# Patient Record
Sex: MALE | Race: WHITE | NOT HISPANIC OR LATINO | Employment: FULL TIME | URBAN - METROPOLITAN AREA
[De-identification: names, ages, dates, MRNs, and addresses within clinical notes are randomized per-mention and may not be internally consistent; named-entity substitution may affect disease eponyms.]

---

## 2017-03-29 ENCOUNTER — HOSPITAL ENCOUNTER (OUTPATIENT)
Dept: LAB | Facility: MEDICAL CENTER | Age: 57
End: 2017-03-29
Payer: COMMERCIAL

## 2017-03-29 LAB
T3FREE SERPL-MCNC: 3.29 PG/ML (ref 2.4–4.2)
T4 FREE SERPL-MCNC: 0.84 NG/DL (ref 0.53–1.43)
TSH SERPL DL<=0.005 MIU/L-ACNC: 2.27 UIU/ML (ref 0.3–3.7)

## 2017-03-29 PROCEDURE — 84482 T3 REVERSE: CPT

## 2017-03-29 PROCEDURE — 84481 FREE ASSAY (FT-3): CPT

## 2017-03-29 PROCEDURE — 36415 COLL VENOUS BLD VENIPUNCTURE: CPT

## 2017-03-29 PROCEDURE — 84443 ASSAY THYROID STIM HORMONE: CPT

## 2017-03-29 PROCEDURE — 84439 ASSAY OF FREE THYROXINE: CPT

## 2017-04-02 LAB — T3REVERSE SERPL-MCNC: 8.5 NG/DL (ref 9–27)

## 2017-04-29 ENCOUNTER — HOSPITAL ENCOUNTER (OUTPATIENT)
Dept: LAB | Facility: MEDICAL CENTER | Age: 57
End: 2017-04-29
Attending: FAMILY MEDICINE
Payer: COMMERCIAL

## 2017-04-29 LAB
ALBUMIN SERPL BCP-MCNC: 4.5 G/DL (ref 3.2–4.9)
ALBUMIN/GLOB SERPL: 1.7 G/DL
ALP SERPL-CCNC: 42 U/L (ref 30–99)
ALT SERPL-CCNC: 26 U/L (ref 2–50)
ANION GAP SERPL CALC-SCNC: 7 MMOL/L (ref 0–11.9)
AST SERPL-CCNC: 21 U/L (ref 12–45)
BASOPHILS # BLD AUTO: 0.8 % (ref 0–1.8)
BASOPHILS # BLD: 0.04 K/UL (ref 0–0.12)
BILIRUB SERPL-MCNC: 0.7 MG/DL (ref 0.1–1.5)
BUN SERPL-MCNC: 11 MG/DL (ref 8–22)
CALCIUM SERPL-MCNC: 9.3 MG/DL (ref 8.5–10.5)
CHLORIDE SERPL-SCNC: 106 MMOL/L (ref 96–112)
CO2 SERPL-SCNC: 24 MMOL/L (ref 20–33)
CREAT SERPL-MCNC: 0.74 MG/DL (ref 0.5–1.4)
DHEA-S SERPL-MCNC: 142.9 UG/DL (ref 51.7–295)
EOSINOPHIL # BLD AUTO: 0.17 K/UL (ref 0–0.51)
EOSINOPHIL NFR BLD: 3.3 % (ref 0–6.9)
ERYTHROCYTE [DISTWIDTH] IN BLOOD BY AUTOMATED COUNT: 44.2 FL (ref 35.9–50)
ESTRADIOL SERPL-MCNC: <20 PG/ML
GFR SERPL CREATININE-BSD FRML MDRD: >60 ML/MIN/1.73 M 2
GLOBULIN SER CALC-MCNC: 2.6 G/DL (ref 1.9–3.5)
GLUCOSE SERPL-MCNC: 102 MG/DL (ref 65–99)
HCT VFR BLD AUTO: 51 % (ref 42–52)
HGB BLD-MCNC: 16.8 G/DL (ref 14–18)
IMM GRANULOCYTES # BLD AUTO: 0.02 K/UL (ref 0–0.11)
IMM GRANULOCYTES NFR BLD AUTO: 0.4 % (ref 0–0.9)
LYMPHOCYTES # BLD AUTO: 1.93 K/UL (ref 1–4.8)
LYMPHOCYTES NFR BLD: 38 % (ref 22–41)
MCH RBC QN AUTO: 29.5 PG (ref 27–33)
MCHC RBC AUTO-ENTMCNC: 32.9 G/DL (ref 33.7–35.3)
MCV RBC AUTO: 89.5 FL (ref 81.4–97.8)
MONOCYTES # BLD AUTO: 0.38 K/UL (ref 0–0.85)
MONOCYTES NFR BLD AUTO: 7.5 % (ref 0–13.4)
NEUTROPHILS # BLD AUTO: 2.54 K/UL (ref 1.82–7.42)
NEUTROPHILS NFR BLD: 50 % (ref 44–72)
NRBC # BLD AUTO: 0 K/UL
NRBC BLD AUTO-RTO: 0 /100 WBC
PLATELET # BLD AUTO: 257 K/UL (ref 164–446)
PMV BLD AUTO: 10.7 FL (ref 9–12.9)
POTASSIUM SERPL-SCNC: 4 MMOL/L (ref 3.6–5.5)
PROT SERPL-MCNC: 7.1 G/DL (ref 6–8.2)
RBC # BLD AUTO: 5.7 M/UL (ref 4.7–6.1)
SODIUM SERPL-SCNC: 137 MMOL/L (ref 135–145)
T3FREE SERPL-MCNC: 3.12 PG/ML (ref 2.4–4.2)
T4 SERPL-MCNC: 5.8 UG/DL (ref 4–12)
THYROPEROXIDASE AB SERPL-ACNC: 5.1 IU/ML (ref 0–9)
TSH SERPL DL<=0.005 MIU/L-ACNC: 2.08 UIU/ML (ref 0.3–3.7)
WBC # BLD AUTO: 5.1 K/UL (ref 4.8–10.8)

## 2017-04-29 PROCEDURE — 84481 FREE ASSAY (FT-3): CPT

## 2017-04-29 PROCEDURE — 84482 T3 REVERSE: CPT

## 2017-04-29 PROCEDURE — 82670 ASSAY OF TOTAL ESTRADIOL: CPT

## 2017-04-29 PROCEDURE — 82627 DEHYDROEPIANDROSTERONE: CPT

## 2017-04-29 PROCEDURE — 84305 ASSAY OF SOMATOMEDIN: CPT

## 2017-04-29 PROCEDURE — 82542 COL CHROMOTOGRAPHY QUAL/QUAN: CPT

## 2017-04-29 PROCEDURE — 84270 ASSAY OF SEX HORMONE GLOBUL: CPT

## 2017-04-29 PROCEDURE — 85025 COMPLETE CBC W/AUTO DIFF WBC: CPT

## 2017-04-29 PROCEDURE — 86376 MICROSOMAL ANTIBODY EACH: CPT

## 2017-04-29 PROCEDURE — 84443 ASSAY THYROID STIM HORMONE: CPT

## 2017-04-29 PROCEDURE — 84402 ASSAY OF FREE TESTOSTERONE: CPT

## 2017-04-29 PROCEDURE — 36415 COLL VENOUS BLD VENIPUNCTURE: CPT

## 2017-04-29 PROCEDURE — 84403 ASSAY OF TOTAL TESTOSTERONE: CPT

## 2017-04-29 PROCEDURE — 84436 ASSAY OF TOTAL THYROXINE: CPT

## 2017-04-29 PROCEDURE — 80053 COMPREHEN METABOLIC PANEL: CPT

## 2017-05-01 LAB
IGF-I SERPL-MCNC: 144 NG/ML (ref 68–245)
SHBG SERPL-SCNC: 45 NMOL/L (ref 11–80)
TESTOST FREE MFR SERPL: 1.5 % (ref 1.6–2.9)
TESTOST FREE SERPL-MCNC: 56 PG/ML (ref 47–244)
TESTOST SERPL-MCNC: 365 NG/DL (ref 300–890)

## 2017-05-03 LAB — ANDROSTANOLONE SERPL-MCNC: 298 PG/ML (ref 106–719)

## 2017-05-04 LAB — T3REVERSE SERPL-MCNC: 10 NG/DL (ref 9–27)

## 2017-05-25 ENCOUNTER — TELEPHONE (OUTPATIENT)
Dept: PULMONOLOGY | Facility: HOSPICE | Age: 57
End: 2017-05-25

## 2017-05-25 DIAGNOSIS — G47.33 OBSTRUCTIVE SLEEP APNEA: ICD-10-CM

## 2017-08-12 ENCOUNTER — HOSPITAL ENCOUNTER (OUTPATIENT)
Dept: LAB | Facility: MEDICAL CENTER | Age: 57
End: 2017-08-12
Attending: FAMILY MEDICINE
Payer: COMMERCIAL

## 2017-08-12 LAB
25(OH)D3 SERPL-MCNC: 40 NG/ML (ref 30–100)
ALBUMIN SERPL BCP-MCNC: 4.3 G/DL (ref 3.2–4.9)
ALBUMIN/GLOB SERPL: 1.6 G/DL
ALP SERPL-CCNC: 42 U/L (ref 30–99)
ALT SERPL-CCNC: 21 U/L (ref 2–50)
ANION GAP SERPL CALC-SCNC: 8 MMOL/L (ref 0–11.9)
AST SERPL-CCNC: 23 U/L (ref 12–45)
BILIRUB SERPL-MCNC: 0.9 MG/DL (ref 0.1–1.5)
BUN SERPL-MCNC: 14 MG/DL (ref 8–22)
CALCIUM SERPL-MCNC: 9.6 MG/DL (ref 8.5–10.5)
CHLORIDE SERPL-SCNC: 104 MMOL/L (ref 96–112)
CO2 SERPL-SCNC: 26 MMOL/L (ref 20–33)
CREAT SERPL-MCNC: 0.84 MG/DL (ref 0.5–1.4)
CRP SERPL HS-MCNC: 1.3 MG/L (ref 0–7.5)
DHEA-S SERPL-MCNC: 300.9 UG/DL (ref 51.7–295)
EST. AVERAGE GLUCOSE BLD GHB EST-MCNC: 100 MG/DL
ESTRADIOL SERPL-MCNC: 44 PG/ML
GFR SERPL CREATININE-BSD FRML MDRD: >60 ML/MIN/1.73 M 2
GLOBULIN SER CALC-MCNC: 2.7 G/DL (ref 1.9–3.5)
GLUCOSE SERPL-MCNC: 98 MG/DL (ref 65–99)
HBA1C MFR BLD: 5.1 % (ref 0–5.6)
HCT VFR BLD AUTO: 53.4 % (ref 42–52)
HCYS SERPL-SCNC: 13.63 UMOL/L
HGB BLD-MCNC: 17.5 G/DL (ref 14–18)
POTASSIUM SERPL-SCNC: 4 MMOL/L (ref 3.6–5.5)
PROT SERPL-MCNC: 7 G/DL (ref 6–8.2)
PSA SERPL-MCNC: 0.24 NG/ML (ref 0–4)
SODIUM SERPL-SCNC: 138 MMOL/L (ref 135–145)
T3FREE SERPL-MCNC: 3.15 PG/ML (ref 2.4–4.2)
TSH SERPL DL<=0.005 MIU/L-ACNC: 1.93 UIU/ML (ref 0.3–3.7)

## 2017-08-12 PROCEDURE — 84402 ASSAY OF FREE TESTOSTERONE: CPT

## 2017-08-12 PROCEDURE — 84270 ASSAY OF SEX HORMONE GLOBUL: CPT

## 2017-08-12 PROCEDURE — 84443 ASSAY THYROID STIM HORMONE: CPT

## 2017-08-12 PROCEDURE — 82542 COL CHROMOTOGRAPHY QUAL/QUAN: CPT

## 2017-08-12 PROCEDURE — 84403 ASSAY OF TOTAL TESTOSTERONE: CPT

## 2017-08-12 PROCEDURE — 85018 HEMOGLOBIN: CPT

## 2017-08-12 PROCEDURE — 80053 COMPREHEN METABOLIC PANEL: CPT

## 2017-08-12 PROCEDURE — 85014 HEMATOCRIT: CPT

## 2017-08-12 PROCEDURE — 83090 ASSAY OF HOMOCYSTEINE: CPT

## 2017-08-12 PROCEDURE — 82306 VITAMIN D 25 HYDROXY: CPT

## 2017-08-12 PROCEDURE — 84481 FREE ASSAY (FT-3): CPT

## 2017-08-12 PROCEDURE — 84153 ASSAY OF PSA TOTAL: CPT

## 2017-08-12 PROCEDURE — 83525 ASSAY OF INSULIN: CPT

## 2017-08-12 PROCEDURE — 84305 ASSAY OF SOMATOMEDIN: CPT

## 2017-08-12 PROCEDURE — 83036 HEMOGLOBIN GLYCOSYLATED A1C: CPT

## 2017-08-12 PROCEDURE — 86141 C-REACTIVE PROTEIN HS: CPT

## 2017-08-12 PROCEDURE — 36415 COLL VENOUS BLD VENIPUNCTURE: CPT

## 2017-08-12 PROCEDURE — 82627 DEHYDROEPIANDROSTERONE: CPT

## 2017-08-12 PROCEDURE — 82670 ASSAY OF TOTAL ESTRADIOL: CPT

## 2017-08-14 LAB
IGF-I SERPL-MCNC: 203 NG/ML (ref 68–245)
INSULIN P FAST SERPL-ACNC: 14 UIU/ML (ref 3–19)
SHBG SERPL-SCNC: 47 NMOL/L (ref 11–80)
TESTOST FREE MFR SERPL: 1.6 % (ref 1.6–2.9)
TESTOST FREE SERPL-MCNC: 121 PG/ML (ref 47–244)
TESTOST SERPL-MCNC: 740 NG/DL (ref 300–890)

## 2017-08-15 LAB — ANDROSTANOLONE SERPL-MCNC: 366 PG/ML (ref 106–719)

## 2018-01-30 ENCOUNTER — HOSPITAL ENCOUNTER (OUTPATIENT)
Dept: LAB | Facility: MEDICAL CENTER | Age: 58
End: 2018-01-30
Attending: FAMILY MEDICINE
Payer: COMMERCIAL

## 2018-01-30 LAB
25(OH)D3 SERPL-MCNC: 27 NG/ML (ref 30–100)
ALBUMIN SERPL BCP-MCNC: 4.4 G/DL (ref 3.2–4.9)
ALBUMIN/GLOB SERPL: 2 G/DL
ALP SERPL-CCNC: 39 U/L (ref 30–99)
ALT SERPL-CCNC: 24 U/L (ref 2–50)
ANION GAP SERPL CALC-SCNC: 10 MMOL/L (ref 0–11.9)
AST SERPL-CCNC: 22 U/L (ref 12–45)
BASOPHILS # BLD AUTO: 0.6 % (ref 0–1.8)
BASOPHILS # BLD: 0.03 K/UL (ref 0–0.12)
BILIRUB SERPL-MCNC: 0.9 MG/DL (ref 0.1–1.5)
BUN SERPL-MCNC: 16 MG/DL (ref 8–22)
CALCIUM SERPL-MCNC: 8.6 MG/DL (ref 8.5–10.5)
CHLORIDE SERPL-SCNC: 104 MMOL/L (ref 96–112)
CO2 SERPL-SCNC: 24 MMOL/L (ref 20–33)
CREAT SERPL-MCNC: 0.85 MG/DL (ref 0.5–1.4)
CRP SERPL HS-MCNC: 1.7 MG/L (ref 0–7.5)
DHEA-S SERPL-MCNC: 285.2 UG/DL (ref 51.7–295)
EOSINOPHIL # BLD AUTO: 0.13 K/UL (ref 0–0.51)
EOSINOPHIL NFR BLD: 2.4 % (ref 0–6.9)
ERYTHROCYTE [DISTWIDTH] IN BLOOD BY AUTOMATED COUNT: 43.5 FL (ref 35.9–50)
ESTRADIOL SERPL-MCNC: <20 PG/ML
GLOBULIN SER CALC-MCNC: 2.2 G/DL (ref 1.9–3.5)
GLUCOSE SERPL-MCNC: 104 MG/DL (ref 65–99)
HCT VFR BLD AUTO: 47.2 % (ref 42–52)
HGB BLD-MCNC: 16.3 G/DL (ref 14–18)
IMM GRANULOCYTES # BLD AUTO: 0.01 K/UL (ref 0–0.11)
IMM GRANULOCYTES NFR BLD AUTO: 0.2 % (ref 0–0.9)
LYMPHOCYTES # BLD AUTO: 2.01 K/UL (ref 1–4.8)
LYMPHOCYTES NFR BLD: 37.5 % (ref 22–41)
MCH RBC QN AUTO: 30.5 PG (ref 27–33)
MCHC RBC AUTO-ENTMCNC: 34.5 G/DL (ref 33.7–35.3)
MCV RBC AUTO: 88.4 FL (ref 81.4–97.8)
MONOCYTES # BLD AUTO: 0.43 K/UL (ref 0–0.85)
MONOCYTES NFR BLD AUTO: 8 % (ref 0–13.4)
NEUTROPHILS # BLD AUTO: 2.75 K/UL (ref 1.82–7.42)
NEUTROPHILS NFR BLD: 51.3 % (ref 44–72)
NRBC # BLD AUTO: 0 K/UL
NRBC BLD-RTO: 0 /100 WBC
PLATELET # BLD AUTO: 238 K/UL (ref 164–446)
PMV BLD AUTO: 10.4 FL (ref 9–12.9)
POTASSIUM SERPL-SCNC: 3.9 MMOL/L (ref 3.6–5.5)
PROT SERPL-MCNC: 6.6 G/DL (ref 6–8.2)
RBC # BLD AUTO: 5.34 M/UL (ref 4.7–6.1)
SODIUM SERPL-SCNC: 138 MMOL/L (ref 135–145)
T3FREE SERPL-MCNC: 3.1 PG/ML (ref 2.4–4.2)
T4 FREE SERPL-MCNC: 0.83 NG/DL (ref 0.53–1.43)
TSH SERPL DL<=0.005 MIU/L-ACNC: 2.01 UIU/ML (ref 0.38–5.33)
WBC # BLD AUTO: 5.4 K/UL (ref 4.8–10.8)

## 2018-01-30 PROCEDURE — 84402 ASSAY OF FREE TESTOSTERONE: CPT

## 2018-01-30 PROCEDURE — 84305 ASSAY OF SOMATOMEDIN: CPT

## 2018-01-30 PROCEDURE — 83525 ASSAY OF INSULIN: CPT

## 2018-01-30 PROCEDURE — 80053 COMPREHEN METABOLIC PANEL: CPT

## 2018-01-30 PROCEDURE — 84482 T3 REVERSE: CPT

## 2018-01-30 PROCEDURE — 86141 C-REACTIVE PROTEIN HS: CPT

## 2018-01-30 PROCEDURE — 85025 COMPLETE CBC W/AUTO DIFF WBC: CPT

## 2018-01-30 PROCEDURE — 36415 COLL VENOUS BLD VENIPUNCTURE: CPT

## 2018-01-30 PROCEDURE — 84403 ASSAY OF TOTAL TESTOSTERONE: CPT

## 2018-01-30 PROCEDURE — 82670 ASSAY OF TOTAL ESTRADIOL: CPT

## 2018-01-30 PROCEDURE — 82627 DEHYDROEPIANDROSTERONE: CPT

## 2018-01-30 PROCEDURE — 84481 FREE ASSAY (FT-3): CPT

## 2018-01-30 PROCEDURE — 82306 VITAMIN D 25 HYDROXY: CPT

## 2018-01-30 PROCEDURE — 84443 ASSAY THYROID STIM HORMONE: CPT

## 2018-01-30 PROCEDURE — 84270 ASSAY OF SEX HORMONE GLOBUL: CPT

## 2018-01-30 PROCEDURE — 84439 ASSAY OF FREE THYROXINE: CPT

## 2018-02-01 LAB
IGF-I SERPL-MCNC: 113 NG/ML (ref 59–206)
IGF-I Z-SCORE SERPL: -0.2
INSULIN P FAST SERPL-ACNC: 16 UIU/ML (ref 3–19)
SHBG SERPL-SCNC: 42 NMOL/L (ref 11–80)
TESTOST FREE MFR SERPL: 1.5 % (ref 1.6–2.9)
TESTOST FREE SERPL-MCNC: 42 PG/ML (ref 47–244)
TESTOST SERPL-MCNC: 271 NG/DL (ref 300–890)

## 2018-02-08 LAB — T3REVERSE SERPL-MCNC: 13.4 NG/DL (ref 9–27)

## 2018-08-18 ENCOUNTER — HOSPITAL ENCOUNTER (OUTPATIENT)
Dept: LAB | Facility: MEDICAL CENTER | Age: 58
End: 2018-08-18
Attending: FAMILY MEDICINE
Payer: COMMERCIAL

## 2018-08-18 LAB
25(OH)D3 SERPL-MCNC: 44 NG/ML (ref 30–100)
ALBUMIN SERPL BCP-MCNC: 4.5 G/DL (ref 3.2–4.9)
ALBUMIN/GLOB SERPL: 1.7 G/DL
ALP SERPL-CCNC: 53 U/L (ref 30–99)
ALT SERPL-CCNC: 23 U/L (ref 2–50)
ANION GAP SERPL CALC-SCNC: 11 MMOL/L (ref 0–11.9)
AST SERPL-CCNC: 17 U/L (ref 12–45)
BASOPHILS # BLD AUTO: 0.5 % (ref 0–1.8)
BASOPHILS # BLD: 0.03 K/UL (ref 0–0.12)
BILIRUB SERPL-MCNC: 1 MG/DL (ref 0.1–1.5)
BUN SERPL-MCNC: 15 MG/DL (ref 8–22)
CALCIUM SERPL-MCNC: 9.5 MG/DL (ref 8.5–10.5)
CHLORIDE SERPL-SCNC: 105 MMOL/L (ref 96–112)
CO2 SERPL-SCNC: 25 MMOL/L (ref 20–33)
CREAT SERPL-MCNC: 0.85 MG/DL (ref 0.5–1.4)
DHEA-S SERPL-MCNC: 231.1 UG/DL (ref 51.7–295)
EOSINOPHIL # BLD AUTO: 0.44 K/UL (ref 0–0.51)
EOSINOPHIL NFR BLD: 7 % (ref 0–6.9)
ERYTHROCYTE [DISTWIDTH] IN BLOOD BY AUTOMATED COUNT: 46.2 FL (ref 35.9–50)
EST. AVERAGE GLUCOSE BLD GHB EST-MCNC: 117 MG/DL
ESTRADIOL SERPL-MCNC: <20 PG/ML
GLOBULIN SER CALC-MCNC: 2.6 G/DL (ref 1.9–3.5)
GLUCOSE SERPL-MCNC: 105 MG/DL (ref 65–99)
HBA1C MFR BLD: 5.7 % (ref 0–5.6)
HCT VFR BLD AUTO: 47.5 % (ref 42–52)
HGB BLD-MCNC: 15.6 G/DL (ref 14–18)
IMM GRANULOCYTES # BLD AUTO: 0.01 K/UL (ref 0–0.11)
IMM GRANULOCYTES NFR BLD AUTO: 0.2 % (ref 0–0.9)
LYMPHOCYTES # BLD AUTO: 2.03 K/UL (ref 1–4.8)
LYMPHOCYTES NFR BLD: 32.5 % (ref 22–41)
MCH RBC QN AUTO: 29.6 PG (ref 27–33)
MCHC RBC AUTO-ENTMCNC: 32.8 G/DL (ref 33.7–35.3)
MCV RBC AUTO: 90.1 FL (ref 81.4–97.8)
MONOCYTES # BLD AUTO: 0.41 K/UL (ref 0–0.85)
MONOCYTES NFR BLD AUTO: 6.6 % (ref 0–13.4)
NEUTROPHILS # BLD AUTO: 3.33 K/UL (ref 1.82–7.42)
NEUTROPHILS NFR BLD: 53.2 % (ref 44–72)
NRBC # BLD AUTO: 0 K/UL
NRBC BLD-RTO: 0 /100 WBC
PLATELET # BLD AUTO: 249 K/UL (ref 164–446)
PMV BLD AUTO: 10.6 FL (ref 9–12.9)
POTASSIUM SERPL-SCNC: 4 MMOL/L (ref 3.6–5.5)
PROT SERPL-MCNC: 7.1 G/DL (ref 6–8.2)
RBC # BLD AUTO: 5.27 M/UL (ref 4.7–6.1)
SODIUM SERPL-SCNC: 141 MMOL/L (ref 135–145)
T3FREE SERPL-MCNC: 2.59 PG/ML (ref 2.4–4.2)
T4 FREE SERPL-MCNC: 0.7 NG/DL (ref 0.53–1.43)
TSH SERPL DL<=0.005 MIU/L-ACNC: 0.98 UIU/ML (ref 0.38–5.33)
WBC # BLD AUTO: 6.3 K/UL (ref 4.8–10.8)

## 2018-08-18 PROCEDURE — 84305 ASSAY OF SOMATOMEDIN: CPT

## 2018-08-18 PROCEDURE — 82627 DEHYDROEPIANDROSTERONE: CPT

## 2018-08-18 PROCEDURE — 84481 FREE ASSAY (FT-3): CPT

## 2018-08-18 PROCEDURE — 36415 COLL VENOUS BLD VENIPUNCTURE: CPT

## 2018-08-18 PROCEDURE — 83036 HEMOGLOBIN GLYCOSYLATED A1C: CPT

## 2018-08-18 PROCEDURE — 84403 ASSAY OF TOTAL TESTOSTERONE: CPT

## 2018-08-18 PROCEDURE — 82306 VITAMIN D 25 HYDROXY: CPT

## 2018-08-18 PROCEDURE — 80053 COMPREHEN METABOLIC PANEL: CPT

## 2018-08-18 PROCEDURE — 83525 ASSAY OF INSULIN: CPT

## 2018-08-18 PROCEDURE — 84439 ASSAY OF FREE THYROXINE: CPT

## 2018-08-18 PROCEDURE — 84270 ASSAY OF SEX HORMONE GLOBUL: CPT

## 2018-08-18 PROCEDURE — 85025 COMPLETE CBC W/AUTO DIFF WBC: CPT

## 2018-08-18 PROCEDURE — 82670 ASSAY OF TOTAL ESTRADIOL: CPT

## 2018-08-18 PROCEDURE — 84443 ASSAY THYROID STIM HORMONE: CPT

## 2018-08-21 LAB
IGF-I SERPL-MCNC: 166 NG/ML (ref 59–206)
IGF-I Z-SCORE SERPL: 1
INSULIN P FAST SERPL-ACNC: 14 UIU/ML (ref 3–19)
SHBG SERPL-SCNC: 59 NMOL/L (ref 11–80)
TESTOST FREE MFR SERPL: 1.2 % (ref 1.6–2.9)
TESTOST FREE SERPL-MCNC: 33 PG/ML (ref 47–244)
TESTOST SERPL-MCNC: 266 NG/DL (ref 300–890)

## 2019-09-27 ENCOUNTER — HOSPITAL ENCOUNTER (OUTPATIENT)
Dept: LAB | Facility: MEDICAL CENTER | Age: 59
End: 2019-09-27
Attending: FAMILY MEDICINE
Payer: COMMERCIAL

## 2019-09-27 LAB
25(OH)D3 SERPL-MCNC: 36 NG/ML (ref 30–100)
ALBUMIN SERPL BCP-MCNC: 4.7 G/DL (ref 3.2–4.9)
ALBUMIN/GLOB SERPL: 2.4 G/DL
ALP SERPL-CCNC: 53 U/L (ref 30–99)
ALT SERPL-CCNC: 18 U/L (ref 2–50)
ANION GAP SERPL CALC-SCNC: 10 MMOL/L (ref 0–11.9)
AST SERPL-CCNC: 20 U/L (ref 12–45)
BASOPHILS # BLD AUTO: 0.8 % (ref 0–1.8)
BASOPHILS # BLD: 0.04 K/UL (ref 0–0.12)
BILIRUB SERPL-MCNC: 0.8 MG/DL (ref 0.1–1.5)
BUN SERPL-MCNC: 14 MG/DL (ref 8–22)
CALCIUM SERPL-MCNC: 9.2 MG/DL (ref 8.5–10.5)
CHLORIDE SERPL-SCNC: 107 MMOL/L (ref 96–112)
CHOLEST SERPL-MCNC: 217 MG/DL (ref 100–199)
CO2 SERPL-SCNC: 25 MMOL/L (ref 20–33)
CREAT SERPL-MCNC: 1.03 MG/DL (ref 0.5–1.4)
CRP SERPL HS-MCNC: 1.1 MG/L (ref 0–7.5)
DHEA-S SERPL-MCNC: 159.9 UG/DL (ref 51.7–295)
EOSINOPHIL # BLD AUTO: 0.12 K/UL (ref 0–0.51)
EOSINOPHIL NFR BLD: 2.4 % (ref 0–6.9)
ERYTHROCYTE [DISTWIDTH] IN BLOOD BY AUTOMATED COUNT: 46.8 FL (ref 35.9–50)
ESTRADIOL SERPL-MCNC: 33 PG/ML
GLOBULIN SER CALC-MCNC: 2 G/DL (ref 1.9–3.5)
GLUCOSE SERPL-MCNC: 92 MG/DL (ref 65–99)
HCT VFR BLD AUTO: 54.4 % (ref 42–52)
HDLC SERPL-MCNC: 57 MG/DL
HGB BLD-MCNC: 16.8 G/DL (ref 14–18)
IMM GRANULOCYTES # BLD AUTO: 0.02 K/UL (ref 0–0.11)
IMM GRANULOCYTES NFR BLD AUTO: 0.4 % (ref 0–0.9)
LDLC SERPL CALC-MCNC: 146 MG/DL
LYMPHOCYTES # BLD AUTO: 1.86 K/UL (ref 1–4.8)
LYMPHOCYTES NFR BLD: 36.9 % (ref 22–41)
MCH RBC QN AUTO: 29.4 PG (ref 27–33)
MCHC RBC AUTO-ENTMCNC: 30.9 G/DL (ref 33.7–35.3)
MCV RBC AUTO: 95.3 FL (ref 81.4–97.8)
MONOCYTES # BLD AUTO: 0.43 K/UL (ref 0–0.85)
MONOCYTES NFR BLD AUTO: 8.5 % (ref 0–13.4)
NEUTROPHILS # BLD AUTO: 2.57 K/UL (ref 1.82–7.42)
NEUTROPHILS NFR BLD: 51 % (ref 44–72)
NRBC # BLD AUTO: 0 K/UL
NRBC BLD-RTO: 0 /100 WBC
PLATELET # BLD AUTO: 234 K/UL (ref 164–446)
PMV BLD AUTO: 10.6 FL (ref 9–12.9)
POTASSIUM SERPL-SCNC: 4.2 MMOL/L (ref 3.6–5.5)
PROT SERPL-MCNC: 6.7 G/DL (ref 6–8.2)
PSA SERPL-MCNC: 0.25 NG/ML (ref 0–4)
RBC # BLD AUTO: 5.71 M/UL (ref 4.7–6.1)
SODIUM SERPL-SCNC: 142 MMOL/L (ref 135–145)
T3FREE SERPL-MCNC: 3.09 PG/ML (ref 2.4–4.2)
TRIGL SERPL-MCNC: 72 MG/DL (ref 0–149)
TSH SERPL DL<=0.005 MIU/L-ACNC: 0.95 UIU/ML (ref 0.38–5.33)
WBC # BLD AUTO: 5 K/UL (ref 4.8–10.8)

## 2019-09-27 PROCEDURE — 84270 ASSAY OF SEX HORMONE GLOBUL: CPT

## 2019-09-27 PROCEDURE — 82306 VITAMIN D 25 HYDROXY: CPT

## 2019-09-27 PROCEDURE — 84481 FREE ASSAY (FT-3): CPT

## 2019-09-27 PROCEDURE — 86141 C-REACTIVE PROTEIN HS: CPT

## 2019-09-27 PROCEDURE — 84403 ASSAY OF TOTAL TESTOSTERONE: CPT

## 2019-09-27 PROCEDURE — 36415 COLL VENOUS BLD VENIPUNCTURE: CPT

## 2019-09-27 PROCEDURE — 82670 ASSAY OF TOTAL ESTRADIOL: CPT

## 2019-09-27 PROCEDURE — 84482 T3 REVERSE: CPT

## 2019-09-27 PROCEDURE — 82627 DEHYDROEPIANDROSTERONE: CPT

## 2019-09-27 PROCEDURE — 80053 COMPREHEN METABOLIC PANEL: CPT

## 2019-09-27 PROCEDURE — 85025 COMPLETE CBC W/AUTO DIFF WBC: CPT

## 2019-09-27 PROCEDURE — 83525 ASSAY OF INSULIN: CPT

## 2019-09-27 PROCEDURE — 80061 LIPID PANEL: CPT

## 2019-09-27 PROCEDURE — 83036 HEMOGLOBIN GLYCOSYLATED A1C: CPT

## 2019-09-27 PROCEDURE — 84153 ASSAY OF PSA TOTAL: CPT

## 2019-09-27 PROCEDURE — 84443 ASSAY THYROID STIM HORMONE: CPT

## 2019-09-28 LAB
EST. AVERAGE GLUCOSE BLD GHB EST-MCNC: 108 MG/DL
HBA1C MFR BLD: 5.4 % (ref 0–5.6)

## 2019-09-30 LAB
INSULIN P FAST SERPL-ACNC: 10 UIU/ML (ref 3–19)
SHBG SERPL-SCNC: 41 NMOL/L (ref 11–80)
TESTOST FREE MFR SERPL: 1.8 % (ref 1.6–2.9)
TESTOST FREE SERPL-MCNC: 128 PG/ML (ref 47–244)
TESTOST SERPL-MCNC: 720 NG/DL (ref 300–890)

## 2019-10-02 LAB — T3REVERSE SERPL-MCNC: 13.7 NG/DL (ref 9–27)

## 2020-02-10 ENCOUNTER — TELEPHONE (OUTPATIENT)
Dept: PULMONOLOGY | Facility: HOSPICE | Age: 60
End: 2020-02-10

## 2020-02-10 NOTE — TELEPHONE ENCOUNTER
Caller: Brenton    Phone Number: [phone] (home)     Message: Pt called and is requesting an order for a replacement on his CPAP Machine.   (Last OV 10/07/16)    Justine-Does pt need an appt first before this?

## 2020-02-10 NOTE — TELEPHONE ENCOUNTER
Yes he needs to be seen first.  The rule is patient's need yearly OVs for any orders, we will make exceptions but only if they are slightly over a year.    This patient not only needs to be seen - but since it has been over 3 yrs, he will need a new referral from his PCP and need to be scheduled in a NP appt slot once the referral is received.    Please advise pt to obtain referral back to our office

## 2020-02-12 NOTE — TELEPHONE ENCOUNTER
02/10/2020-Called and spoke with pt regarding this. Pt was not happy. He said what do I need to get my supplies. Told pt he would need to get a referral from his PCP and when we receive this we can schedule an appt. Pt hung up.

## 2020-02-29 ENCOUNTER — HOSPITAL ENCOUNTER (OUTPATIENT)
Dept: LAB | Facility: MEDICAL CENTER | Age: 60
End: 2020-02-29
Attending: FAMILY MEDICINE
Payer: COMMERCIAL

## 2020-02-29 LAB
DHEA-S SERPL-MCNC: 212.7 UG/DL (ref 51.7–295)
EST. AVERAGE GLUCOSE BLD GHB EST-MCNC: 114 MG/DL
ESTRADIOL SERPL-MCNC: <20 PG/ML
HBA1C MFR BLD: 5.6 % (ref 0–5.6)
PSA SERPL-MCNC: 0.23 NG/ML (ref 0–4)
T3FREE SERPL-MCNC: 2.92 PG/ML (ref 2.4–4.2)
T4 FREE SERPL-MCNC: 0.69 NG/DL (ref 0.53–1.43)
THYROPEROXIDASE AB SERPL-ACNC: 2.3 IU/ML (ref 0–9)
TSH SERPL DL<=0.005 MIU/L-ACNC: 1.33 UIU/ML (ref 0.38–5.33)

## 2020-02-29 PROCEDURE — 84270 ASSAY OF SEX HORMONE GLOBUL: CPT

## 2020-02-29 PROCEDURE — 86376 MICROSOMAL ANTIBODY EACH: CPT

## 2020-02-29 PROCEDURE — 86800 THYROGLOBULIN ANTIBODY: CPT

## 2020-02-29 PROCEDURE — 84305 ASSAY OF SOMATOMEDIN: CPT

## 2020-02-29 PROCEDURE — 82627 DEHYDROEPIANDROSTERONE: CPT

## 2020-02-29 PROCEDURE — 82670 ASSAY OF TOTAL ESTRADIOL: CPT

## 2020-02-29 PROCEDURE — 84482 T3 REVERSE: CPT

## 2020-02-29 PROCEDURE — 84439 ASSAY OF FREE THYROXINE: CPT

## 2020-02-29 PROCEDURE — 84481 FREE ASSAY (FT-3): CPT

## 2020-02-29 PROCEDURE — 84432 ASSAY OF THYROGLOBULIN: CPT

## 2020-02-29 PROCEDURE — 84443 ASSAY THYROID STIM HORMONE: CPT

## 2020-02-29 PROCEDURE — 84402 ASSAY OF FREE TESTOSTERONE: CPT

## 2020-02-29 PROCEDURE — 84153 ASSAY OF PSA TOTAL: CPT

## 2020-02-29 PROCEDURE — 83036 HEMOGLOBIN GLYCOSYLATED A1C: CPT

## 2020-02-29 PROCEDURE — 36415 COLL VENOUS BLD VENIPUNCTURE: CPT

## 2020-02-29 PROCEDURE — 84403 ASSAY OF TOTAL TESTOSTERONE: CPT

## 2020-02-29 PROCEDURE — 84140 ASSAY OF PREGNENOLONE: CPT

## 2020-03-02 LAB
IGF-I SERPL-MCNC: 148 NG/ML (ref 56–203)
IGF-I Z-SCORE SERPL: 0.7
SHBG SERPL-SCNC: 39 NMOL/L (ref 11–80)
TESTOST FREE MFR SERPL: 1.7 % (ref 1.6–2.9)
TESTOST FREE SERPL-MCNC: 61 PG/ML (ref 47–244)
TESTOST SERPL-MCNC: 367 NG/DL (ref 300–890)
THYROGLOB AB SERPL-ACNC: <0.9 IU/ML (ref 0–4)
THYROGLOB SERPL-MCNC: 3.8 NG/ML (ref 1.3–31.8)
THYROGLOB SERPL-MCNC: NORMAL NG/ML (ref 1.3–31.8)

## 2020-03-03 LAB — PREG SERPL-MCNC: 70 NG/DL (ref 23–173)

## 2020-03-04 LAB — T3REVERSE SERPL-MCNC: 13.3 NG/DL (ref 9–27)

## 2020-03-30 ENCOUNTER — SLEEP CENTER VISIT (OUTPATIENT)
Dept: SLEEP MEDICINE | Facility: MEDICAL CENTER | Age: 60
End: 2020-03-30
Payer: COMMERCIAL

## 2020-03-30 VITALS
OXYGEN SATURATION: 94 % | HEIGHT: 70 IN | WEIGHT: 240 LBS | HEART RATE: 86 BPM | DIASTOLIC BLOOD PRESSURE: 88 MMHG | BODY MASS INDEX: 34.36 KG/M2 | RESPIRATION RATE: 14 BRPM | SYSTOLIC BLOOD PRESSURE: 142 MMHG

## 2020-03-30 DIAGNOSIS — G47.33 OSA (OBSTRUCTIVE SLEEP APNEA): ICD-10-CM

## 2020-03-30 PROCEDURE — 99203 OFFICE O/P NEW LOW 30 MIN: CPT | Performed by: FAMILY MEDICINE

## 2020-03-30 RX ORDER — THYROID,PORK 15 MG
15 TABLET ORAL DAILY
COMMUNITY
Start: 2020-01-28

## 2020-03-30 RX ORDER — THYROID 60 MG
60 TABLET ORAL DAILY
COMMUNITY
Start: 2020-01-28

## 2020-03-30 ASSESSMENT — FIBROSIS 4 INDEX: FIB4 SCORE: 1.17

## 2020-03-30 NOTE — PROGRESS NOTES
"     [facility]  Consult Note     Date: 3/30/2020 / Time: 3:15 PM    Patient ID:   Name:             Brenton Joe   YOB: 1960  Age:                 59 y.o.  male   MRN:               [id number]      Thank you for requesting a sleep medicine consultation on Brenton Joe at the sleep center. He presents today with the chief complaints of JONATHAN and to establish as a new pt. He was previously seen by PMA. Pt was diagnosed with JONATHAN on 2/20/15 via split night study. It showed moderate JONATHAN with AHI of 21.7//hr and the best tolerated pressure was CPAP 8 cm with improved AHI of 0/hr and O2 kelin 90%. he has been on CPAP 8 cm since then.The initial presenting symptoms were snoring and excessive daytime sleepiness. He is referred by Dr. Keita for evaluation and treatment of sleep disorder breathing.     HISTORY OF PRESENT ILLNESS:       At night,  Brenton Joe goes to bed around 10 pm on weekdays and around 11 pm on the weekends. He gets out of bed at 6 am on weekdays and at 8 am on the weekends.  He averages about 8 hrs of sleep on a good night. Pt has rarely has a bad nights. He falls asleep within 15 minutes. He awakens 1 times during the night due to bathroom use. It takes him few min to fall back asleep.    As per suppose questioner,He is not aware of snoring/breathing pauses/gasping or choking in sleep since he has been on the CPAP.  He  denies any symptoms of restless legs syndrome such as an \"urge to move\"  He  legs in the evening or bedtime. He  denies any symptoms of narcolepsy such as sleep paralysis or cataplexy, or any symptoms to suggest parasomnias such as sleep walking or acting out of dreams. He  has not used any medications for the sleep problem.    Overall, he does finds his sleep refreshing.In terms of  excessive daytime sleepiness, he denies of sleepiness while  at work, while reading or watching TV or while driving. He does not take regular naps.     REVIEW OF SYSTEMS: "       Constitutional: Denies fevers, Denies weight changes  Eyes: Denies changes in vision, no eye pain  Ears/Nose/Throat/Mouth: Denies nasal congestion or sore throat   Cardiovascular: Denies chest pain or palpitations   Respiratory: Denies shortness of breath , Denies cough  Gastrointestinal/Hepatic: Denies abdominal pain, nausea, vomiting, diarrhea  Genitourinary: Denies bladder dysfunction, dysuria or frequency  Musculoskeletal/Rheum: Denies  joint pain and swelling   Skin/Breast: Denies rash  Neurological: Denies headache, confusion, memory loss or focal weakness/parasthesias  Psychiatric: denies mood disorder     Comprehensive review of systems form is reviewed with the patient and is attached in the EMR.     PMH:  has a past medical history of Chickenpox, Hyperlipemia (3/31/2014), Hypothyroidism, Hypothyroidism (2014), Scarlet fever, Sleep apnea, Tear meniscus knee (2009), Thyroid nodule (2014), and Tinnitus of both ears (3/28/2014). He also has no past medical history of CAD (coronary artery disease), COPD, Liver disease, Psychiatric disorder, or Seizure disorder (HCC).  MEDS:   Current Outpatient Medications:   •  ARMOUR THYROID 15 MG Tab, , Disp: , Rfl:   •  ARMOUR THYROID 60 MG Tab, , Disp: , Rfl:   •  OMEGA 3 PO, Take  by mouth every day. Takes two, Disp: , Rfl:   •  MULTIVITAMINS PO, Take  by mouth every day., Disp: , Rfl:   •  levothyroxine (SYNTHROID) 100 MCG Tab, TAKE 1 TABLET BY MOUTH EVERY DAY (Patient not taking: Reported on 3/30/2020), Disp: 30 Tab, Rfl: 3  •  SYNTHROID 100 MCG Tab, TAKE 1 TABLET BY MOUTH EVERY DAY (Patient not taking: Reported on 3/30/2020), Disp: 30 Tab, Rfl: 0  •  fluticasone (FLONASE) 50 MCG/ACT nasal spray, USE 1 SPRAY IN EACH NOSTRIL EVERY DAY (Patient not taking: Reported on 3/30/2020), Disp: 1 Bottle, Rfl: 3  ALLERGIES:   Allergies   Allergen Reactions   • Nkda [No Known Drug Allergy]      SURGHX:   Past Surgical History:   Procedure Laterality Date   • COLONOSCOPY   "3/30/12    normal- done by [facility]   • KNEE ARTHROSCOPY  10/5/2009    Performed by CHEPE FRANZ at SURGERY [facility] ORS   • MEDIAL MENISCECTOMY  10/5/2009    Performed by CHEPE FRANZ at SURGERY [facility] ORS   • FINGER EXPLORATION  7/2007    repair artery left index finger   • ARTHROSCOPY, KNEE     • INTUBATION     • TONSILLECTOMY AND ADENOIDECTOMY       SOCHX:  reports that he has never smoked. He has never used smokeless tobacco. He reports current alcohol use. He reports that he does not use drugs.   FH:   Family History   Problem Relation Age of Onset   • Hyperlipidemia Mother    • Hypertension Mother    • Diabetes Mother         borderline DM   • Hyperlipidemia Father    • Hypertension Father    • Cancer Paternal Grandmother         stomach cancer   • Cancer Other    • Sleep Apnea Son            Physical Exam:  Vitals/ General Appearance:   Weight/BMI: Body mass index is 34.44 kg/m².  /88 (BP Location: Left arm, Patient Position: Sitting, BP Cuff Size: Adult)   Pulse 86   Resp 14   Ht 1.778 m (5' 10\")   Wt 108.9 kg (240 lb)   SpO2 94%   Vitals:    03/30/20 1459   BP: 142/88   BP Location: Left arm   Patient Position: Sitting   BP Cuff Size: Adult   Pulse: 86   Resp: 14   SpO2: 94%   Weight: 108.9 kg (240 lb)   Height: 1.778 m (5' 10\")       Pt. is alert and oriented to time, place and person. Cooperative and in no apparent distress.       -Head: Atraumatic, normocephalic.   -. Ears: Normal tympanic membrane and no discharge  -. Nose: No inferior turbinate hypertophy, no septal deviation, no polyp.   -. Throat: Oropharynx appears crowded in that the palate is  overhanging (Malam Sary scale 4. Tonsils are not enlarged, uvula is large, pharynx not inflamed.   -. Neck: Supple. No thyromegaly  -. Chest: Trachea central,   -. Lungs auscultation: B/L good air entry, vesicular breath sounds, no adventitious sounds  -. Heart auscultation: 1st and 2nd heart sounds normal, regular rhythm. No " appreciable murmur.  - Extremities: no clubbing, no pedal edema.  - Skin: No rash  - NEUROLOGICAL EXAMINATION: On neurological exam, the patient was alert and oriented x3. speech was clear and fluent without dysarthria.      INVESTIGATIONS:     1.Obstructive Sleep Apnea .He  Is currently on CPAP 8 with simplus mask. 30 day compliance was downloaded which shows adequate compliance.     The pathophysiology of sleep anea and the increased risk of cardiovascular morbidity from untreated sleep apnea is discussed in detail with the patient.      He is urged to avoid supine sleep, weight gain and alcoholic beverages since all of these can worsen sleep apnea. He is cautioned against drowsy driving. If He feels sleepy while driving, He must pull over for a break/nap, rather than persist on the road, in the interest of He own safety and that of others on the road.   Plan   - Continue CPAP 8 cm with simplus mask    - New Auto CPAP 8-10 cm with f30 mask   - F/u in 60-90 days to assess the efficiacy of recommended pressure    - compliance download was reviewed and discussed with the pt   - compliance was reinforced

## 2020-08-13 ENCOUNTER — APPOINTMENT (OUTPATIENT)
Dept: SLEEP MEDICINE | Facility: MEDICAL CENTER | Age: 60
End: 2020-08-13
Payer: COMMERCIAL

## 2020-09-01 ENCOUNTER — TELEMEDICINE (OUTPATIENT)
Dept: SLEEP MEDICINE | Facility: MEDICAL CENTER | Age: 60
End: 2020-09-01
Payer: COMMERCIAL

## 2020-09-01 VITALS — BODY MASS INDEX: 32.35 KG/M2 | HEIGHT: 70 IN | WEIGHT: 226 LBS

## 2020-09-01 DIAGNOSIS — G47.33 OSA (OBSTRUCTIVE SLEEP APNEA): ICD-10-CM

## 2020-09-01 PROCEDURE — 99213 OFFICE O/P EST LOW 20 MIN: CPT | Mod: 95,CR | Performed by: FAMILY MEDICINE

## 2020-09-01 ASSESSMENT — FIBROSIS 4 INDEX: FIB4 SCORE: 1.17

## 2020-09-01 NOTE — PROGRESS NOTES
Telemedicine Visit: Established Patient     This encounter was conducted via Zoom . Verbal consent was obtained. Patient's identity was verified.   Given the importance of social distancing and other strategies recommended to reduce the risk of COVID-19 transmission, I am providing medical care to this patient via audio/video visit in place of an in person visit at the request of the patient.    [facility] Follow Up Note     Date: 9/1/2020 / Time: 10:22 AM    Patient ID:   Name:             Brenton Joe   YOB: 1960  Age:                 59 y.o.  male   MRN:               [id number]      Thank you for requesting a sleep medicine consultation on Brenton Joe at the sleep center. He presents today with the chief complaints of JONATHAN and 1st compliance on the new CPAP follow up.     HISTORY OF PRESENT ILLNESS:       Pt is currently on Auto CPAP 8-10 cm with F 30 mask. He goes to sleep around 10:30 pm and wakes up around 6:30 am. He is getting about 7.5 hrs of sleep on a good night.. The bad nights are rare per week. Overall, he does finds his sleep refreshing. He does not take regular naps. Denies any other sleep disorder or usage of sleep aid.He is using CPAP most days of the week. Pt reports 8+ hrs of average nightly use of CPAP. Pt denies snoring, gasping,choking.Pt also denies significant mask leak that is interfering with sleep. The 30 day compliance was downloaded which shows adequate compliance with more that 4 hr usage about 100%. The AHI is has improved to 0.5/hr. The mask leak is normal. The symptoms of JONATHAN are well controlled.     SLEEP HISTORY   He was previously seen by PMA. Pt was diagnosed with JONATHAN on 2/20/15 via split night study. It showed moderate JONATHAN with AHI of 21.7//hr and the best tolerated pressure was CPAP 8 cm with improved AHI of 0/hr and O2 kelin 90%.      REVIEW OF SYSTEMS:       Constitutional: Denies fevers, Denies weight changes  Eyes: Denies changes in  vision, no eye pain  Ears/Nose/Throat/Mouth: Denies nasal congestion or sore throat   Cardiovascular: Denies chest pain or palpitations   Respiratory: Denies shortness of breath , Denies cough  Gastrointestinal/Hepatic: Denies abdominal pain, nausea,   Genitourinary: Deniesdysuria or frequency  Musculoskeletal/Rheum: Denies  joint pain and swelling   Skin/Breast: Denies rash,   Neurological: Denies headache, confusion, memory loss or focal weakness/parasthesias  Psychiatric: denies mood disorder   Sleep: denies snoring and apneas     Comprehensive review of systems form is reviewed with the patient and is attached in the EMR.     PMH:  has a past medical history of Chickenpox, Hyperlipemia (3/31/2014), Hypothyroidism, Hypothyroidism (2014), Scarlet fever, Sleep apnea, Tear meniscus knee (2009), Thyroid nodule (2014), and Tinnitus of both ears (3/28/2014). He also has no past medical history of CAD (coronary artery disease), COPD, Liver disease, Psychiatric disorder, or Seizure disorder (HCC).  MEDS:   Current Outpatient Medications:   •  ARMOUR THYROID 15 MG Tab, , Disp: , Rfl:   •  ARMOUR THYROID 60 MG Tab, , Disp: , Rfl:   •  SYNTHROID 100 MCG Tab, TAKE 1 TABLET BY MOUTH EVERY DAY, Disp: 30 Tab, Rfl: 0  •  OMEGA 3 PO, Take  by mouth every day. Takes two, Disp: , Rfl:   •  MULTIVITAMINS PO, Take  by mouth every day., Disp: , Rfl:   •  levothyroxine (SYNTHROID) 100 MCG Tab, TAKE 1 TABLET BY MOUTH EVERY DAY (Patient not taking: Reported on 3/30/2020), Disp: 30 Tab, Rfl: 3  •  fluticasone (FLONASE) 50 MCG/ACT nasal spray, USE 1 SPRAY IN EACH NOSTRIL EVERY DAY (Patient not taking: Reported on 3/30/2020), Disp: 1 Bottle, Rfl: 3  ALLERGIES:   Allergies   Allergen Reactions   • Nkda [No Known Drug Allergy]      SURGHX:   Past Surgical History:   Procedure Laterality Date   • COLONOSCOPY  3/30/12    normal- done by [facility]   • KNEE ARTHROSCOPY  10/5/2009    Performed by CHEPE FRANZ at [facility]   • MEDIAL  "MENISCECTOMY  10/5/2009    Performed by CHEPE FRANZ at SURGERY [facility]S   • FINGER EXPLORATION  7/2007    repair artery left index finger   • ARTHROSCOPY, KNEE     • INTUBATION     • TONSILLECTOMY AND ADENOIDECTOMY       SOCHX:  reports that he has never smoked. He has never used smokeless tobacco. He reports current alcohol use. He reports that he does not use drugs..  FH:   Family History   Problem Relation Age of Onset   • Hyperlipidemia Mother    • Hypertension Mother    • Diabetes Mother         borderline DM   • Hyperlipidemia Father    • Hypertension Father    • Cancer Paternal Grandmother         stomach cancer   • Cancer Other    • Sleep Apnea Son          Physical Exam:  Vitals/ General Appearance:   Weight/BMI: Body mass index is 32.43 kg/m².  Ht 1.778 m (5' 10\")   Wt 102.5 kg (226 lb)   Vitals:    09/01/20 1006   Weight: 102.5 kg (226 lb)   Height: 1.778 m (5' 10\")       Pt. is alert and oriented to time, place and person. Cooperative and in no apparent distress.       Constitutional: Alert, no distress, well-groomed.  Skin: No rashes in visible areas.  Eye: Round. Conjunctiva clear, lids normal. No icterus.   ENMT: Lips pink without lesions, good dentition, moist mucous membranes. Phonation normal.  Neck: No masses, no thyromegaly. Moves freely without pain.  CV: Pulse as reported by patient  Respiratory: Unlabored respiratory effort, no cough or audible wheeze  Psych: Alert and oriented x3, normal affect and mood.     ASSESSMENT AND PLAN     1. Sleep Apnea: The pathophysiology of sleep anea and the increased risk of cardiovascular morbidity from untreated sleep apnea is discussed in detail with the patient. He is urged to avoid supine sleep, weight gain and alcoholic beverages since all of these can worsen sleep apnea. He is cautioned against drowsy driving. If He feels sleepy while driving, He must pull over for a break/nap, rather than persist on the road, in the interest of He own " safety and that of others on the road.   Plan   - Continue ACPAP 8-10 cm    - supplies are refilled    - compliance download was reviewed and discussed with the pt   - compliance was reinforced     2. Regarding treatment of other past medical problems and general health maintenance,  He is urged to follow up with PCP.

## 2020-11-10 ENCOUNTER — HOSPITAL ENCOUNTER (OUTPATIENT)
Dept: LAB | Facility: MEDICAL CENTER | Age: 60
End: 2020-11-10
Attending: FAMILY MEDICINE
Payer: COMMERCIAL

## 2020-11-10 LAB
25(OH)D3 SERPL-MCNC: 58 NG/ML (ref 30–100)
ALBUMIN SERPL BCP-MCNC: 4.4 G/DL (ref 3.2–4.9)
ALBUMIN/GLOB SERPL: 1.8 G/DL
ALP SERPL-CCNC: 53 U/L (ref 30–99)
ALT SERPL-CCNC: 36 U/L (ref 2–50)
ANION GAP SERPL CALC-SCNC: 12 MMOL/L (ref 7–16)
AST SERPL-CCNC: 28 U/L (ref 12–45)
BASOPHILS # BLD AUTO: 0.8 % (ref 0–1.8)
BASOPHILS # BLD: 0.05 K/UL (ref 0–0.12)
BILIRUB SERPL-MCNC: 0.7 MG/DL (ref 0.1–1.5)
BUN SERPL-MCNC: 16 MG/DL (ref 8–22)
CALCIUM SERPL-MCNC: 9.4 MG/DL (ref 8.5–10.5)
CHLORIDE SERPL-SCNC: 103 MMOL/L (ref 96–112)
CO2 SERPL-SCNC: 25 MMOL/L (ref 20–33)
CREAT SERPL-MCNC: 1.04 MG/DL (ref 0.5–1.4)
CRP SERPL HS-MCNC: 2.1 MG/L (ref 0–7.5)
DHEA-S SERPL-MCNC: 231 UG/DL (ref 51.7–295)
EOSINOPHIL # BLD AUTO: 0.24 K/UL (ref 0–0.51)
EOSINOPHIL NFR BLD: 3.9 % (ref 0–6.9)
ERYTHROCYTE [DISTWIDTH] IN BLOOD BY AUTOMATED COUNT: 45.1 FL (ref 35.9–50)
ESTRADIOL SERPL-MCNC: 16.9 PG/ML
GLOBULIN SER CALC-MCNC: 2.5 G/DL (ref 1.9–3.5)
GLUCOSE SERPL-MCNC: 102 MG/DL (ref 65–99)
HCT VFR BLD AUTO: 54 % (ref 42–52)
HGB BLD-MCNC: 17.7 G/DL (ref 14–18)
IMM GRANULOCYTES # BLD AUTO: 0.02 K/UL (ref 0–0.11)
IMM GRANULOCYTES NFR BLD AUTO: 0.3 % (ref 0–0.9)
LYMPHOCYTES # BLD AUTO: 1.98 K/UL (ref 1–4.8)
LYMPHOCYTES NFR BLD: 32.1 % (ref 22–41)
MCH RBC QN AUTO: 29.3 PG (ref 27–33)
MCHC RBC AUTO-ENTMCNC: 32.8 G/DL (ref 33.7–35.3)
MCV RBC AUTO: 89.4 FL (ref 81.4–97.8)
MONOCYTES # BLD AUTO: 0.44 K/UL (ref 0–0.85)
MONOCYTES NFR BLD AUTO: 7.1 % (ref 0–13.4)
NEUTROPHILS # BLD AUTO: 3.43 K/UL (ref 1.82–7.42)
NEUTROPHILS NFR BLD: 55.8 % (ref 44–72)
NRBC # BLD AUTO: 0 K/UL
NRBC BLD-RTO: 0 /100 WBC
PLATELET # BLD AUTO: 254 K/UL (ref 164–446)
PMV BLD AUTO: 10.5 FL (ref 9–12.9)
POTASSIUM SERPL-SCNC: 4.3 MMOL/L (ref 3.6–5.5)
PROT SERPL-MCNC: 6.9 G/DL (ref 6–8.2)
RBC # BLD AUTO: 6.04 M/UL (ref 4.7–6.1)
SODIUM SERPL-SCNC: 140 MMOL/L (ref 135–145)
T3FREE SERPL-MCNC: 2.99 PG/ML (ref 2–4.4)
TSH SERPL DL<=0.005 MIU/L-ACNC: 2.42 UIU/ML (ref 0.38–5.33)
WBC # BLD AUTO: 6.2 K/UL (ref 4.8–10.8)

## 2020-11-10 PROCEDURE — 82627 DEHYDROEPIANDROSTERONE: CPT

## 2020-11-10 PROCEDURE — 83525 ASSAY OF INSULIN: CPT

## 2020-11-10 PROCEDURE — 36415 COLL VENOUS BLD VENIPUNCTURE: CPT

## 2020-11-10 PROCEDURE — 84481 FREE ASSAY (FT-3): CPT

## 2020-11-10 PROCEDURE — 85025 COMPLETE CBC W/AUTO DIFF WBC: CPT

## 2020-11-10 PROCEDURE — 82306 VITAMIN D 25 HYDROXY: CPT

## 2020-11-10 PROCEDURE — 84305 ASSAY OF SOMATOMEDIN: CPT

## 2020-11-10 PROCEDURE — 84443 ASSAY THYROID STIM HORMONE: CPT

## 2020-11-10 PROCEDURE — 82670 ASSAY OF TOTAL ESTRADIOL: CPT

## 2020-11-10 PROCEDURE — 86141 C-REACTIVE PROTEIN HS: CPT

## 2020-11-10 PROCEDURE — 84403 ASSAY OF TOTAL TESTOSTERONE: CPT

## 2020-11-10 PROCEDURE — 80053 COMPREHEN METABOLIC PANEL: CPT

## 2020-11-10 PROCEDURE — 84402 ASSAY OF FREE TESTOSTERONE: CPT

## 2020-11-10 PROCEDURE — 84270 ASSAY OF SEX HORMONE GLOBUL: CPT

## 2020-11-11 LAB
SHBG SERPL-SCNC: 38 NMOL/L (ref 11–80)
TESTOST FREE MFR SERPL: 1.8 % (ref 1.6–2.9)
TESTOST FREE SERPL-MCNC: 89 PG/ML (ref 47–244)
TESTOST SERPL-MCNC: 506 NG/DL (ref 300–890)

## 2020-11-12 LAB — INSULIN P FAST SERPL-ACNC: 12 UIU/ML (ref 3–19)

## 2020-11-15 LAB
IGF-I SERPL-MCNC: 164 NG/ML (ref 55–203)
IGF-I Z-SCORE SERPL: 1

## 2020-12-17 ENCOUNTER — HOSPITAL ENCOUNTER (EMERGENCY)
Facility: MEDICAL CENTER | Age: 60
End: 2020-12-17
Attending: EMERGENCY MEDICINE
Payer: COMMERCIAL

## 2020-12-17 ENCOUNTER — APPOINTMENT (OUTPATIENT)
Dept: RADIOLOGY | Facility: MEDICAL CENTER | Age: 60
End: 2020-12-17
Attending: EMERGENCY MEDICINE
Payer: COMMERCIAL

## 2020-12-17 VITALS
SYSTOLIC BLOOD PRESSURE: 149 MMHG | TEMPERATURE: 98.9 F | WEIGHT: 229.28 LBS | DIASTOLIC BLOOD PRESSURE: 84 MMHG | OXYGEN SATURATION: 96 % | HEART RATE: 86 BPM | HEIGHT: 70 IN | RESPIRATION RATE: 12 BRPM | BODY MASS INDEX: 32.82 KG/M2

## 2020-12-17 DIAGNOSIS — R00.2 PALPITATIONS: ICD-10-CM

## 2020-12-17 DIAGNOSIS — I10 HYPERTENSION, UNSPECIFIED TYPE: ICD-10-CM

## 2020-12-17 LAB
ANION GAP SERPL CALC-SCNC: 11 MMOL/L (ref 7–16)
BASOPHILS # BLD AUTO: 0.9 % (ref 0–1.8)
BASOPHILS # BLD: 0.09 K/UL (ref 0–0.12)
BUN SERPL-MCNC: 13 MG/DL (ref 8–22)
CALCIUM SERPL-MCNC: 9 MG/DL (ref 8.5–10.5)
CHLORIDE SERPL-SCNC: 103 MMOL/L (ref 96–112)
CO2 SERPL-SCNC: 25 MMOL/L (ref 20–33)
CREAT SERPL-MCNC: 0.75 MG/DL (ref 0.5–1.4)
EKG IMPRESSION: NORMAL
EOSINOPHIL # BLD AUTO: 0 K/UL (ref 0–0.51)
EOSINOPHIL NFR BLD: 0 % (ref 0–6.9)
ERYTHROCYTE [DISTWIDTH] IN BLOOD BY AUTOMATED COUNT: 44.8 FL (ref 35.9–50)
GLUCOSE SERPL-MCNC: 105 MG/DL (ref 65–99)
HCT VFR BLD AUTO: 52 % (ref 42–52)
HGB BLD-MCNC: 17.4 G/DL (ref 14–18)
LG PLATELETS BLD QL SMEAR: NORMAL
LYMPHOCYTES # BLD AUTO: 2.96 K/UL (ref 1–4.8)
LYMPHOCYTES NFR BLD: 29.6 % (ref 22–41)
MANUAL DIFF BLD: NORMAL
MCH RBC QN AUTO: 29.1 PG (ref 27–33)
MCHC RBC AUTO-ENTMCNC: 33.5 G/DL (ref 33.7–35.3)
MCV RBC AUTO: 87 FL (ref 81.4–97.8)
MONOCYTES # BLD AUTO: 0.61 K/UL (ref 0–0.85)
MONOCYTES NFR BLD AUTO: 6.1 % (ref 0–13.4)
MORPHOLOGY BLD-IMP: NORMAL
NEUTROPHILS # BLD AUTO: 6.35 K/UL (ref 1.82–7.42)
NEUTROPHILS NFR BLD: 63.5 % (ref 44–72)
NRBC # BLD AUTO: 0 K/UL
NRBC BLD-RTO: 0 /100 WBC
NT-PROBNP SERPL IA-MCNC: 57 PG/ML (ref 0–125)
PLATELET # BLD AUTO: 362 K/UL (ref 164–446)
PLATELET BLD QL SMEAR: NORMAL
PMV BLD AUTO: 10 FL (ref 9–12.9)
POTASSIUM SERPL-SCNC: 3.6 MMOL/L (ref 3.6–5.5)
RBC # BLD AUTO: 5.98 M/UL (ref 4.7–6.1)
RBC BLD AUTO: PRESENT
SODIUM SERPL-SCNC: 139 MMOL/L (ref 135–145)
TROPONIN T SERPL-MCNC: 11 NG/L (ref 6–19)
WBC # BLD AUTO: 10 K/UL (ref 4.8–10.8)

## 2020-12-17 PROCEDURE — 36415 COLL VENOUS BLD VENIPUNCTURE: CPT

## 2020-12-17 PROCEDURE — 71045 X-RAY EXAM CHEST 1 VIEW: CPT

## 2020-12-17 PROCEDURE — 80048 BASIC METABOLIC PNL TOTAL CA: CPT

## 2020-12-17 PROCEDURE — 93005 ELECTROCARDIOGRAM TRACING: CPT | Performed by: EMERGENCY MEDICINE

## 2020-12-17 PROCEDURE — 84484 ASSAY OF TROPONIN QUANT: CPT

## 2020-12-17 PROCEDURE — 83880 ASSAY OF NATRIURETIC PEPTIDE: CPT

## 2020-12-17 PROCEDURE — 85027 COMPLETE CBC AUTOMATED: CPT

## 2020-12-17 PROCEDURE — 99284 EMERGENCY DEPT VISIT MOD MDM: CPT

## 2020-12-17 PROCEDURE — 93005 ELECTROCARDIOGRAM TRACING: CPT

## 2020-12-17 PROCEDURE — 85007 BL SMEAR W/DIFF WBC COUNT: CPT

## 2020-12-17 RX ORDER — HYDROCHLOROTHIAZIDE 12.5 MG/1
12.5 TABLET ORAL DAILY
Qty: 30 TAB | Refills: 0 | Status: SHIPPED | OUTPATIENT
Start: 2020-12-17 | End: 2020-12-17

## 2020-12-17 RX ORDER — HYDROXYZINE PAMOATE 50 MG/1
50 CAPSULE ORAL
COMMUNITY
Start: 2020-12-14 | End: 2020-12-17

## 2020-12-17 ASSESSMENT — FIBROSIS 4 INDEX: FIB4 SCORE: 1.08

## 2020-12-18 NOTE — DISCHARGE INSTRUCTIONS
You were seen in the emergency department for palpitations and high blood pressure.  Given your symptoms, you are being referred to cardiology for follow-up.  Your blood pressure returned to normal while in the emergency department.  It is advisable to wait for further evaluation before starting you on blood pressure medications as these can make your blood pressure dangerously low and you may lose consciousness.    Please return to the emergency department or seek medical attention if you develop:  Difficulty breathing, chest pains, loss of consciousness, any other new or concerning findings

## 2020-12-18 NOTE — ED PROVIDER NOTES
"ED Provider Note    Scribed for Cory Cullen M.D. by Fátima Craig. 12/17/2020,  6:15 PM.    Means of Arrival: Walk In  History obtained from: Patient  History limited by: None    CHIEF COMPLAINT  Chief Complaint   Patient presents with   • Palpitations   • Hypertension       HPI  Brenton Joe is a 60 y.o. male with a history of hypertension and hypothyroidism who presents to the Emergency Department for heart palpitations onset today. The patient states that he was hospitalized at [facility] at the beginning of December after testing positive for Covid-19 and was diagnosed with new onset hypertension. He reports he was discharged 7 days ago and when he returned home, he had \"fluid overload\", bilateral leg edema, and urinary frequency. Patient says he urinated most of the fluids out, and started to feel more improved, but woke up this morning with heart palpitations and hypertension. He states he took his blood pressure several times today. The first time he took his blood pressure he had a pressure of 190/90, and when he took it again a few hours later, he had a pressure of 260/116. The patient was then prompted to come into the [facility] ED this afternoon to be further evaluated. Trazodone was taken for alleviation, and no exacerbating factors were noted. Patient has associated ear ringing, hypertension, and Covid-19 fog, but denies chest pain, fever, chills, or shortness of breath. He also denies taking any recent travels outside of the country. Patient drinks alcohol, but does not use drugs or smoke. He has no known allergies and does not take any daily medications. His PCP is Dr. Keita, and his next appointment with him is on 12/23/2020.    REVIEW OF SYSTEMS  CONSTITUTIONAL:  Positive Covid-19 fog. No fever or chills  HENT: Positive ear ringing   CARDIOVASCULAR: Positive hypertension and heart palpitations. No chest pain   RESPIRATORY:  No shortness of breath    See HPI for further details.   All other systems " "are negative.     PAST MEDICAL HISTORY  Past Medical History:   Diagnosis Date   • Chickenpox    • COVID-19 12/2020   • HTN (hypertension)    • Hyperlipemia 3/31/2014   • Hypothyroidism    • Hypothyroidism 2014   • Scarlet fever    • Sleep apnea    • Tear meniscus knee 2009    right repair   • Thyroid nodule 2014    R \"incidental\" 2.1 mm   • Tinnitus of both ears 3/28/2014       FAMILY HISTORY  Family History   Problem Relation Age of Onset   • Hyperlipidemia Mother    • Hypertension Mother    • Diabetes Mother         borderline DM   • Hyperlipidemia Father    • Hypertension Father    • Cancer Paternal Grandmother         stomach cancer   • Cancer Other    • Sleep Apnea Son        SOCIAL HISTORY   reports that he has never smoked. He has never used smokeless tobacco. He reports current alcohol use. He reports that he does not use drugs.    SURGICAL HISTORY  Past Surgical History:   Procedure Laterality Date   • COLONOSCOPY  3/30/12    normal- done by [facility]   • KNEE ARTHROSCOPY  10/5/2009    Performed by CHEPE FRANZ at SURGERY [facility]   • MEDIAL MENISCECTOMY  10/5/2009    Performed by CHEPE FRANZ at SURGERY [facility] ORS   • FINGER EXPLORATION  7/2007    repair artery left index finger   • ARTHROSCOPY, KNEE     • INTUBATION     • TONSILLECTOMY AND ADENOIDECTOMY         CURRENT MEDICATIONS  Home Medications     Reviewed by Danis Adams (Pharmacy Tech) on 12/17/20 at 1935  Med List Status: Complete   Medication Last Dose Status   ARMOUR THYROID 15 MG Tab 12/17/2020 Active   ARMOUR THYROID 60 MG Tab 12/17/2020 Active   Barberry-Oreg Grape-Goldenseal (BERBERINE COMPLEX PO) 12/17/2020 Active   fluticasone (FLONASE) 50 MCG/ACT nasal spray Not Taking Active   levothyroxine (SYNTHROID) 100 MCG Tab  Active   multivitamin (THERAGRAN) Tab 12/17/2020 Active   NON SPECIFIED 12/17/2020 Active   OMEGA 3 PO 12/17/2020 Active   Pseudoephedrine-APAP-DM (DAYQUIL PO) >3 days ago Active   SYNTHROID 100 MCG Tab " "Not Taking Active   TRAZODONE HCL PO >2 days ago Active                ALLERGIES  Allergies   Allergen Reactions   • Nkda [No Known Drug Allergy]        PHYSICAL EXAM  VITAL SIGNS: BP (!) 166/101   Pulse (!) 103   Temp 37.3 °C (99.1 °F) (Temporal)   Resp 16   Ht 1.778 m (5' 10\")   Wt 104 kg (229 lb 4.5 oz)   SpO2 95%   BMI 32.90 kg/m²    Gen: Alert, no acute distress  HEENT: ATNC  Eyes: PERRL, EOMI, normal conjunctiva.   Neck: trachea midline  Resp: no respiratory distress. Lungs clear bilaterally, No wheezing, rales, or rhonchi  CV: No JVD. Regular rate, Regular rhythm, No murmurs, rubs, or gallops  Abd: non-distended. Soft, Non tender   Ext: Equal radial pulses, Trace pitting edema to the bilateral lower extremities, No calf tenderness, No deformities  Endo: No thryomegaly   Psych: normal mood  Neuro: speech fluent     DIAGNOSTIC STUDIES / PROCEDURES     EKG  Results for orders placed or performed during the hospital encounter of 20   EKG   Result Value Ref Range    Report       [facility] Emergency Dept.    Test Date:  2020  Pt Name:    ANIYAH LAFLEUR                  Department: ER  MRN:        [id number]                      Room:  Gender:     Male                         Technician: [id number]  :        1960                   Requested By:ER TRIAGE PROTOCOL  Order #:    [id number]                    Reading MD: Cory Cullen    Measurements  Intervals                                Axis  Rate:       96                           P:          35  NV:         196                          QRS:        6  QRSD:       100                          T:          8  QT:         352  QTc:        445    Interpretive Statements  SINUS RHYTHM  BORDERLINE AV CONDUCTION DELAY  PROBABLE LEFT ATRIAL ABNORMALITY  ANTERIOR INFARCT, AGE INDETERMINATE  Compared to ECG 2009 17:15:33  No significant changes  Electronically Signed On 2020 18:46:31 PST by Cory Cullen          LABS  Labs Reviewed "   CBC WITH DIFFERENTIAL - Abnormal; Notable for the following components:       Result Value    MCHC 33.5 (*)     All other components within normal limits   BASIC METABOLIC PANEL - Abnormal; Notable for the following components:    Glucose 105 (*)     All other components within normal limits   TROPONIN   PROBRAIN NATRIURETIC PEPTIDE, NT   ESTIMATED GFR   DIFFERENTIAL MANUAL   PERIPHERAL SMEAR REVIEW   PLATELET ESTIMATE   MORPHOLOGY     All labs reviewed by me.    RADIOLOGY  DX-CHEST-PORTABLE (1 VIEW)   Final Result      Ill-defined, peripheral bilateral pulmonary opacities, which may be due to active or prior COVID pneumonia.        The radiologist’s interpretation of all radiology studies have been reviewed by me.    COURSE & MEDICAL DECISION MAKING  Pertinent Labs & Imaging studies reviewed. (See chart for details)    6:15 PM Patient seen and examined at bedside. Discussed plan of care with patient. I informed them that labs and imaging will be ordered to evaluate symptoms. The patient is understanding and agreeable with plan of care.     6:22 PM Patient was reevaluated. He was told that his heart rate has improved, but his white blood cell count is still low. Patient was informed a CT scan will be ordered to rule out any other abnormalities. He is understanding and agreeable to the plan of care.       PPE Note: I verified that the patient was wearing a mask and I was wearing appropriate PPE every time I entered the room. The patient's mask was on the patient at all times during my encounter except for a brief view of the oropharynx.     Medical Decision Making:  Patient presents with palpitations as well as hypertension.  His hypertension downtrend in the emergency department, will defer antihypertensives to either cardiology or the patient's primary care provider given the improvement in his blood pressure.  The patient does have palpitations but demonstrates no high risk features on EKG such as preexcitation,  long QT, short QT, ischemia.  Chest x-ray consistent with his prior COVID-19.  His respiratory symptoms have been improving.  He has a negative troponin for cardiac injury.  He is referred to cardiology for follow-up.  He has a an appointment with his doctor next week.  No signs or symptoms of DVT/PE he was given return precautions, anticipatory guidance, and the opportunist questions prior to discharge.        DISPOSITION:  Patient will be discharged home in stable condition.    FOLLOW UP:  Heriberto Tsang M.D.  [address] [place] [address] [phone]  [id number]  [facility] [id number]  [phone]    Schedule an appointment as soon as possible for a visit       Outpatient Anticoagulation Services  [address] [place] [id number] [phone]  Schedule an appointment as soon as possible for a visit   cardiology      OUTPATIENT MEDICATIONS:  Discharge Medication List as of 12/17/2020  9:21 PM            FINAL IMPRESSION  1. Palpitations    2. Hypertension, unspecified type            I, Fátima Craig (Higinio), am scribing for, and in the presence of, Cory Cullen M.D..    Electronically signed by: Fátima Craig (Scribe), 12/17/2020    I, Cory Cullen M.D. personally performed the services described in this documentation, as scribed by Fátima Craig in my presence, and it is both accurate and complete. C    The note accurately reflects work and decisions made by me.  Cory Cullen M.D.  12/17/2020  10:19 PM

## 2020-12-18 NOTE — ED NOTES
PIV established, blood drawn and sent to lab, patient updated on POC, awaiting Xray, report to GERMAINE Neal.

## 2020-12-18 NOTE — ED NOTES
Brenton Joe is being discharged from the Emergency Department in stable condition. Discharge and follow up instructions were given to patient.   Brenton Escalante Walker is alert and oriented and verbalizes understanding. VSS. The patient ambulates with steady gait.

## 2020-12-18 NOTE — ED TRIAGE NOTES
59 y/o male ambulatory to triage with c/o heart palpitations and new diagnosis of htn. Pt was admitted to [facility] at the beginning of the month for Covid-19 (He was discharged and has been self isolating, he has past the 10 days recommended by the CDC for quarantine). Pt states that while admitted for Covid he was found to be hypertensive, he was told to follow up with his pcp and has an appointment scheduled. Pt presents today for feelings of palpitations which are new and bp readings of 220 systolic, reporting full feeling in head and ears and flushed skin. Pt back for EKG at this time.

## 2020-12-29 ENCOUNTER — TELEPHONE (OUTPATIENT)
Dept: CARDIOLOGY | Facility: MEDICAL CENTER | Age: 60
End: 2020-12-29

## 2020-12-29 NOTE — TELEPHONE ENCOUNTER
LVM asking if patient has seen another Cardiologist in the past or had any cardiac testing done outside of [facility] to call with information so that records can be requested prior to appointment.

## 2021-01-06 ENCOUNTER — OFFICE VISIT (OUTPATIENT)
Dept: CARDIOLOGY | Facility: MEDICAL CENTER | Age: 61
End: 2021-01-06
Payer: COMMERCIAL

## 2021-01-06 VITALS
SYSTOLIC BLOOD PRESSURE: 132 MMHG | BODY MASS INDEX: 33.07 KG/M2 | HEIGHT: 70 IN | DIASTOLIC BLOOD PRESSURE: 88 MMHG | HEART RATE: 86 BPM | WEIGHT: 231 LBS | OXYGEN SATURATION: 95 %

## 2021-01-06 DIAGNOSIS — R94.31 ABNORMAL EKG: ICD-10-CM

## 2021-01-06 DIAGNOSIS — I10 ESSENTIAL HYPERTENSION: ICD-10-CM

## 2021-01-06 DIAGNOSIS — R00.2 PALPITATIONS: ICD-10-CM

## 2021-01-06 PROCEDURE — 99244 OFF/OP CNSLTJ NEW/EST MOD 40: CPT | Performed by: INTERNAL MEDICINE

## 2021-01-06 RX ORDER — LISINOPRIL 10 MG/1
10 TABLET ORAL DAILY
COMMUNITY
End: 2021-11-01

## 2021-01-06 ASSESSMENT — FIBROSIS 4 INDEX: FIB4 SCORE: 0.76

## 2021-01-06 ASSESSMENT — ENCOUNTER SYMPTOMS
HEMOPTYSIS: 0
NEUROLOGICAL NEGATIVE: 1
SHORTNESS OF BREATH: 1
EYES NEGATIVE: 1
PALPITATIONS: 1
PSYCHIATRIC NEGATIVE: 1
MUSCULOSKELETAL NEGATIVE: 1
GASTROINTESTINAL NEGATIVE: 1
COUGH: 1
WEIGHT LOSS: 0

## 2021-01-06 NOTE — PROGRESS NOTES
Chief Complaint   Patient presents with   • Palpitations     new patient   Recent diagnosis of hypertension    Subjective:   Brenton Joe is a 60 y.o. male who presents today for evaluation of above issues    He is seen in consultation at the request of Dr. Tsang palpitations and recent gnosis hypertension.  He has history of sleep apnea and thyroid dysfunction and hypercholesteremia but no diabetes mellitus.  According to him, his blood pressure has been borderline elevated systolic around 140s mmHg for many years.  Week after Thanksgiving he hospitalized at [facility] for Covid.  He was having cough and shortness of breath.  He was given Remdesivir as well as significant amount of intravenous fluid.  He required only 2 L of nasal cannula oxygen supplement while in the hospital and was released home after 5 days.  He did not believe that he has any troponin or cardiac testing during admission.    He gained about 15 pounds during that admission and felt fluid overload.  He took a couple doses of hydrochlorothiazide that belonged to his wife.  Shortly after that he started to noticing palpitation described as strong thumping heart rate was also slightly faster than his usual but was only running in the 80 to 90 bpm range.  His blood pressure was also noted to be markedly elevated.   At one point it was according to him as high as 212/112 mmHg.  He sought medical attention at [facility] emergency room about 3 weeks ago.  Pressure was 166/101 mmHg.  Heart rate of 103 bpm.    EKG from December 17 by my review shows sinus rhythm, with QS pattern in lead V1 to V3 suggestive of prior anteroseptal scar but was present on prior EKG in 2009 when he underwent stress myocardial perfusion imaging for pre of cardiac evaluation.    NT-pro BNP was 57: K+ was 3.6  Tn T was normal.    There was no significant arrhythmia detected.  He was released from emergency room after a few hour observation and advised  "to follow-up with his primary care provider.    He was started on lisinopril milligrams daily a week ago.    He has not been monitoring his blood pressure at home so far    His EKG in 2009 by my review also suggested left ventricular hypertrophy    Stress myocardial perfusion imaging in 2009 was positive for ischemic changes EKG at high workload but no ischemia on myocardial perfusion scan.    He also had cardiac CT calcium score of 0 in 2016.    He has never had echocardiography so far.    No family history of cardiomyopathy or sudden death    Both parents have hypertension but still alive in their 80s    Past Medical History:   Diagnosis Date   • Chickenpox    • COVID-19 12/2020   • HTN (hypertension)    • Hyperlipemia 3/31/2014   • Hypothyroidism    • Hypothyroidism 2014   • Scarlet fever    • Sleep apnea    • Tear meniscus knee 2009    right repair   • Thyroid nodule 2014    R \"incidental\" 2.1 mm   • Tinnitus of both ears 3/28/2014     Past Surgical History:   Procedure Laterality Date   • COLONOSCOPY  3/30/12    normal- done by [facility]   • KNEE ARTHROSCOPY  10/5/2009    Performed by CHEPE FRANZ at SURGERY [facility]   • MEDIAL MENISCECTOMY  10/5/2009    Performed by CHEPE FRANZ at SURGERY [facility]   • FINGER EXPLORATION  7/2007    repair artery left index finger   • ARTHROSCOPY, KNEE     • INTUBATION     • TONSILLECTOMY AND ADENOIDECTOMY       Family History   Problem Relation Age of Onset   • Hyperlipidemia Mother    • Hypertension Mother    • Diabetes Mother         borderline DM   • Hyperlipidemia Father    • Hypertension Father    • Cancer Paternal Grandmother         stomach cancer   • Cancer Other    • Sleep Apnea Son      Social History     Socioeconomic History   • Marital status:      Spouse name: Not on file   • Number of children: 4   • Years of education: Not on file   • Highest education level: Not on file   Occupational History   • Occupation: district      Employer: " OTHER   Social Needs   • Financial resource strain: Not on file   • Food insecurity     Worry: Not on file     Inability: Not on file   • Transportation needs     Medical: Not on file     Non-medical: Not on file   Tobacco Use   • Smoking status: Never Smoker   • Smokeless tobacco: Never Used   Substance and Sexual Activity   • Alcohol use: Yes     Alcohol/week: 0.0 oz     Comment: 2-3 drinks/week   • Drug use: No   • Sexual activity: Yes     Partners: Male   Lifestyle   • Physical activity     Days per week: Not on file     Minutes per session: Not on file   • Stress: Not on file   Relationships   • Social connections     Talks on phone: Not on file     Gets together: Not on file     Attends Anabaptist service: Not on file     Active member of club or organization: Not on file     Attends meetings of clubs or organizations: Not on file     Relationship status: Not on file   • Intimate partner violence     Fear of current or ex partner: Not on file     Emotionally abused: Not on file     Physically abused: Not on file     Forced sexual activity: Not on file   Other Topics Concern   • Not on file   Social History Narrative    ** Merged History Encounter **          Allergies   Allergen Reactions   • Nkda [No Known Drug Allergy]      Outpatient Encounter Medications as of 1/6/2021   Medication Sig Dispense Refill   • lisinopril (PRINIVIL) 10 MG Tab Take 10 mg by mouth every day.     • NON SPECIFIED Take 1 Cap by mouth every day. Cumin supplement     • Barberry-Oreg Grape-Goldenseal (BERBERINE COMPLEX PO) Take 1 Cap by mouth every day.     • TRAZODONE HCL PO Take 1 Tab by mouth at bedtime as needed.     • multivitamin (THERAGRAN) Tab Take 1 Tab by mouth every day.     • ARMOUR THYROID 15 MG Tab Take 15 mg by mouth every day. Take with 60 mg tab for a total dose of 75 mg     • ARMOUR THYROID 60 MG Tab Take 60 mg by mouth every day. Take with 15 mg tab for a total dose of 75 mg     • OMEGA 3 PO Take 1 Cap by mouth every  "day. Takes two     • Pseudoephedrine-APAP-DM (DAYQUIL PO) Take 30 mL by mouth 1 time a day as needed.     • levothyroxine (SYNTHROID) 100 MCG Tab TAKE 1 TABLET BY MOUTH EVERY DAY (Patient not taking: Reported on 3/30/2020) 30 Tab 3   • SYNTHROID 100 MCG Tab TAKE 1 TABLET BY MOUTH EVERY DAY (Patient not taking: Reported on 12/17/2020) 30 Tab 0   • fluticasone (FLONASE) 50 MCG/ACT nasal spray USE 1 SPRAY IN EACH NOSTRIL EVERY DAY (Patient not taking: Reported on 3/30/2020) 1 Bottle 3     No facility-administered encounter medications on file as of 1/6/2021.      Review of Systems   Constitutional: Negative for malaise/fatigue and weight loss.        Gained about 15 pounds during hospitalization the end of November  Loss all back after he took a couple of doses of hydrochlorothiazide   Also gaining some weight over the year   He thinks it is mostly muscle weight from working out (power lifting)   HENT: Negative.    Eyes: Negative.    Respiratory: Positive for cough and shortness of breath. Negative for hemoptysis.         Uses CPAP regularly for sleep apnea for many years   Cardiovascular: Positive for palpitations and leg swelling.   Gastrointestinal: Negative.    Genitourinary: Negative.    Musculoskeletal: Negative.    Neurological: Negative.    Endo/Heme/Allergies: Negative.         Take testosterone injection monthly  Take thyroid medication regularly for some time     Psychiatric/Behavioral: Negative.    All other systems reviewed and are negative.       Objective:   /88   Pulse 86   Ht 1.778 m (5' 10\")   Wt 104.8 kg (231 lb)   SpO2 95%   BMI 33.15 kg/m²     Physical Exam   Constitutional: He is oriented to person, place, and time. He appears well-developed and well-nourished.   Neck: No JVD present.   Cardiovascular: Normal rate and regular rhythm. Exam reveals no gallop.   No murmur heard.  Pulmonary/Chest: Effort normal and breath sounds normal. No respiratory distress. He has no wheezes. He has no " rales.   Abdominal: Soft. He exhibits no distension. There is no abdominal tenderness.   Musculoskeletal:         General: No edema.   Neurological: He is alert and oriented to person, place, and time.   Skin: Skin is warm and dry. No erythema.   Psychiatric: He has a normal mood and affect. His behavior is normal.     Chest X-ray from 12/17 by my review showed mild cardiomegaly with bilateral congestion    Lipid profile from September 2019  HDL 57 triglycerides 72  LDL has been in the 1 4050 range at least since 2011    Thyroid function last November was normal    Assessment:     1. Palpitations  EC-ECHOCARDIOGRAM COMPLETE W/ CONT   2. Essential hypertension  EC-ECHOCARDIOGRAM COMPLETE W/ CONT   3. Abnormal EKG         Medical Decision Making:  Today's Assessment / Status / Plan:     His palpitations could be from ectopic beat from electrolyte imbalance from diuretics and or anxiety. This however has resolved.  We will hold off on any cardiac monitor for now.    EKG findings are likely from ventricular hypertrophy from hypertensive heart disease or body habitus.  Although some degree of ventricular hypertrophy could be from longstanding vigorous exercise.  His chest x-ray however suggests cardiomegaly.  We obtain echocardiography.  Recent fluid retention was also likely from large amount of fluid administration during admission. BNP was normal.  Therefore cardiac dysfunction is unlikely. This issue has also resolved.    Given the finding on his EKG, I believe that he has treated hypertension.  I agree with Dr. Tsang that he should take antihypertensive medication.  His blood pressure is in reasonable range today on lisinopril 10 mg daily.  I did not make any changes medication today.  I however advised him to monitor his blood pressure regularly in the next couple of weeks with the goal of systolic under 135 diastolic under 80 and contact us if they are out of range.    We briefly discussed about his  LDL cholesterol.  He prefers not to take any statin.    As mentioned above, his coronary calcium score was 0 about 5 years ago.  He stated he is scheduled for repeated chemistry in a week or 2.    If his echocardiography did not show any sign of prior myocardial infarction or other significant normalities besides left ventricle hypertrophy, we will see him back in about 6 months for follow-up.  We will definitely happy to see him sooner if his palpitations recur or if he developed other cardiac symptoms prior to that time.  We will keep you posted about our findings and further recommendations as they become available. Please also do not hesitate to call for any questions.  Thank you kindly for allowing me to participate in the care of this patient.

## 2021-02-09 ENCOUNTER — HOSPITAL ENCOUNTER (OUTPATIENT)
Dept: CARDIOLOGY | Facility: MEDICAL CENTER | Age: 61
End: 2021-02-09
Attending: INTERNAL MEDICINE
Payer: COMMERCIAL

## 2021-02-09 ENCOUNTER — HOSPITAL ENCOUNTER (OUTPATIENT)
Dept: LAB | Facility: MEDICAL CENTER | Age: 61
End: 2021-02-09
Attending: FAMILY MEDICINE
Payer: COMMERCIAL

## 2021-02-09 ENCOUNTER — HOSPITAL ENCOUNTER (OUTPATIENT)
Dept: RADIOLOGY | Facility: MEDICAL CENTER | Age: 61
End: 2021-02-09
Attending: FAMILY MEDICINE
Payer: COMMERCIAL

## 2021-02-09 DIAGNOSIS — R00.2 PALPITATIONS: ICD-10-CM

## 2021-02-09 DIAGNOSIS — I10 ESSENTIAL HYPERTENSION: ICD-10-CM

## 2021-02-09 DIAGNOSIS — U07.1 INFECTION DUE TO 2019-NCOV: ICD-10-CM

## 2021-02-09 LAB
ANION GAP SERPL CALC-SCNC: 12 MMOL/L (ref 7–16)
BUN SERPL-MCNC: 17 MG/DL (ref 8–22)
CALCIUM SERPL-MCNC: 9.4 MG/DL (ref 8.5–10.5)
CHLORIDE SERPL-SCNC: 102 MMOL/L (ref 96–112)
CO2 SERPL-SCNC: 26 MMOL/L (ref 20–33)
CREAT SERPL-MCNC: 0.93 MG/DL (ref 0.5–1.4)
GLUCOSE SERPL-MCNC: 84 MG/DL (ref 65–99)
LV EJECT FRACT  99904: 60
LV EJECT FRACT MOD 2C 99903: 62.92
LV EJECT FRACT MOD 4C 99902: 52.37
LV EJECT FRACT MOD BP 99901: 57.59
POTASSIUM SERPL-SCNC: 4.5 MMOL/L (ref 3.6–5.5)
SODIUM SERPL-SCNC: 140 MMOL/L (ref 135–145)

## 2021-02-09 PROCEDURE — 93306 TTE W/DOPPLER COMPLETE: CPT | Mod: 26 | Performed by: INTERNAL MEDICINE

## 2021-02-09 PROCEDURE — 71046 X-RAY EXAM CHEST 2 VIEWS: CPT

## 2021-02-09 PROCEDURE — 36415 COLL VENOUS BLD VENIPUNCTURE: CPT

## 2021-02-09 PROCEDURE — 80048 BASIC METABOLIC PNL TOTAL CA: CPT

## 2021-02-09 PROCEDURE — 93306 TTE W/DOPPLER COMPLETE: CPT

## 2021-03-15 DIAGNOSIS — Z23 NEED FOR VACCINATION: ICD-10-CM

## 2021-05-26 ENCOUNTER — TELEMEDICINE (OUTPATIENT)
Dept: SLEEP MEDICINE | Facility: MEDICAL CENTER | Age: 61
End: 2021-05-26
Payer: COMMERCIAL

## 2021-05-26 VITALS
SYSTOLIC BLOOD PRESSURE: 130 MMHG | DIASTOLIC BLOOD PRESSURE: 74 MMHG | WEIGHT: 230 LBS | HEIGHT: 70 IN | BODY MASS INDEX: 32.93 KG/M2 | HEART RATE: 80 BPM

## 2021-05-26 DIAGNOSIS — I10 HYPERTENSION, UNSPECIFIED TYPE: ICD-10-CM

## 2021-05-26 DIAGNOSIS — U07.1 PNEUMONIA DUE TO COVID-19 VIRUS: ICD-10-CM

## 2021-05-26 DIAGNOSIS — G47.33 OBSTRUCTIVE SLEEP APNEA: ICD-10-CM

## 2021-05-26 DIAGNOSIS — J12.82 PNEUMONIA DUE TO COVID-19 VIRUS: ICD-10-CM

## 2021-05-26 PROCEDURE — 99213 OFFICE O/P EST LOW 20 MIN: CPT | Mod: 95 | Performed by: NURSE PRACTITIONER

## 2021-05-26 RX ORDER — LISINOPRIL 20 MG/1
20 TABLET ORAL
COMMUNITY
Start: 2021-03-05 | End: 2021-11-01 | Stop reason: SDUPTHER

## 2021-05-26 ASSESSMENT — FIBROSIS 4 INDEX: FIB4 SCORE: 0.76

## 2021-05-26 NOTE — PROGRESS NOTES
Virtual Visit: Established Patient   This visit was conducted via Zoom using secure and encrypted videoconferencing technology. The patient was in a private location in the [facility].    The patient's identity was confirmed and verbal consent was obtained for this virtual visit.  Given the importance of social distancing and other strategies recommended to reduce the risk of COVID-19 transmission, I am providing medical care to this patient via audio/video visit in place of an in person visit at the request of the patient.  Subjective:   CC:   Chief Complaint   Patient presents with   • Follow-Up     Apnea-Last seen 09/01/2020       Brenton Joe is a 60 y.o. male presenting for evaluation and management of JONATHAN. Former PMA patient.  Patient is a district .  PMH includes HTN, JONATHAN, Covid 19 positive in December of 2020 with Covid 19 pneumonia, thyroid nodule, dyslipidemia, hypothyroidism, never smoker, T&A.    Patient was diagnosed with COVID-19 in December 2020 and overall it sounds like the patient is recovering well.  The Covid has been managed by his PCP. He was prescribed night time O2 when he was released from the hospital but only used it once and ended up turning in the concentrator.  He denies any shortness of breath, recently participated in power lifting match and also has been able to ride his bicycle up to 18 miles.  He continues to work as a district to .  Compliance report from 4/26/21-5/25/21 was downloaded and reviewed with the patient which showed autoCPAP 8-10 cmH2O, 100% compliance, 8 hrs 16 min use, AHI of 0.5. He is tolerating the pressure and mask well. He goes to bed between 10:30-11 pm and wakes up between 6:30-7 am.  He continues to stay active by working out 4-5 times a week by lifting weights.  He does not tend to go for walks as he did have a right total knee arthroplasty in the past and is pending a left total knee arthroplasty this July.  He continues to f/u with PCP  for BP management and is taking lisinopril 20 mg qd.  He otherwise denies any new health problems or medications.    Sleep/Pulm/Card Study History:  Chest x-ray from 2/9/21 indicated persistent left greater than right bilateral interstitial opacities consistent with history of Covid 19 pneumonia.    Echo from 2/9/21 indicated Normal left ventricular systolic function. Left ventricular ejection fraction is visually estimated to be 60%.    PSG split night study from 2/20/15 indicated an AHI of 21.7//hr and the best tolerated pressure was CPAP 8 cm with improved AHI of 0/hr and O2 kelin 90%.    ROS   Denies any recent fevers or chills. No nausea or vomiting. No chest pains or shortness of breath.     Allergies   Allergen Reactions   • Nkda [No Known Drug Allergy]        Current medicines (including changes today)  Current Outpatient Medications   Medication Sig Dispense Refill   • lisinopril (PRINIVIL) 20 MG Tab Take 20 mg by mouth every day. FOR BLOOD PRESSURE     • NON SPECIFIED Take 1 Cap by mouth every day. Cumin supplement     • Barberry-Oreg Grape-Goldenseal (BERBERINE COMPLEX PO) Take 1 Cap by mouth every day.     • TRAZODONE HCL PO Take 1 Tab by mouth at bedtime as needed.     • Pseudoephedrine-APAP-DM (DAYQUIL PO) Take 30 mL by mouth 1 time a day as needed.     • multivitamin (THERAGRAN) Tab Take 1 Tab by mouth every day.     • ARMOUR THYROID 15 MG Tab Take 15 mg by mouth every day. Take with 60 mg tab for a total dose of 75 mg     • ARMOUR THYROID 60 MG Tab Take 60 mg by mouth every day. Take with 15 mg tab for a total dose of 75 mg     • fluticasone (FLONASE) 50 MCG/ACT nasal spray USE 1 SPRAY IN EACH NOSTRIL EVERY DAY 1 Bottle 3   • OMEGA 3 PO Take 1 Cap by mouth every day. Takes two     • lisinopril (PRINIVIL) 10 MG Tab Take 10 mg by mouth every day.     • levothyroxine (SYNTHROID) 100 MCG Tab TAKE 1 TABLET BY MOUTH EVERY DAY (Patient not taking: Reported on 3/30/2020) 30 Tab 3   • SYNTHROID 100 MCG Tab  "TAKE 1 TABLET BY MOUTH EVERY DAY (Patient not taking: Reported on 12/17/2020) 30 Tab 0     No current facility-administered medications for this visit.       Patient Active Problem List    Diagnosis Date Noted   • Obstructive sleep apnea 09/16/2015   • Hypothyroidism 09/16/2015   • Dyslipidemia 09/16/2015   • Thyroid nodule        Family History   Problem Relation Age of Onset   • Hyperlipidemia Mother    • Hypertension Mother    • Diabetes Mother         borderline DM   • Hyperlipidemia Father    • Hypertension Father    • Cancer Paternal Grandmother         stomach cancer   • Cancer Other    • Sleep Apnea Son        He  has a past medical history of Chickenpox, COVID-19 (12/2020), HTN (hypertension), Hyperlipemia (3/31/2014), Hypothyroidism, Hypothyroidism (2014), Scarlet fever, Sleep apnea, Tear meniscus knee (2009), Thyroid nodule (2014), and Tinnitus of both ears (3/28/2014). He also has no past medical history of CAD (coronary artery disease), COPD, Liver disease, Psychiatric disorder, or Seizure disorder (HCC).  He  has a past surgical history that includes finger exploration (7/2007); knee arthroscopy (10/5/2009); medial meniscectomy (10/5/2009); tonsillectomy and adenoidectomy; colonoscopy (3/30/12); Intubation; and arthroscopy, knee.       Objective:   /74   Pulse 80   Ht 1.778 m (5' 10\")   Wt 104 kg (230 lb)   BMI 33.00 kg/m²     Physical Exam:  Constitutional: Alert, no distress, well-groomed.  Skin: No rashes in visible areas.  Eye: Round. Conjunctiva clear, lids normal. No icterus.   ENMT: Lips pink without lesions, good dentition, moist mucous membranes. Phonation normal.  Neck: Moves freely without pain.  Respiratory: Unlabored respiratory effort, no cough or audible wheeze  Psych: Alert and oriented x3, normal affect and mood.       Assessment and Plan:   The following treatment plan was discussed:     1. Obstructive sleep apnea  - DME Mask and Supplies    2. BMI 33.0-33.9,adult    3. " Hypertension, unspecified type    4. Pneumonia due to COVID-19 virus    Other orders  - lisinopril (PRINIVIL) 20 MG Tab; Take 20 mg by mouth every day. FOR BLOOD PRESSURE      Discussed the cardiovascular and neuropsychiatric risks of untreated JONATHAN; including but not limited to: HTN, DM, MI, ASCVD, CVA, CHF, traffic accidents.     1. Compliance report from 4/26/21-5/25/21 was downloaded and reviewed with the patient which showed autoCPAP 8-10 cmH2O, 100% compliance, 8 hrs 16 min use, AHI of 0.5. Continue autoCPAP. Patient is compliant and benefiting from autoCPAP therapy for management of JONATHAN. Advised patient to continue to use the CPAP every night for more than four hours for optimal health benefit.    *DME order ([employer]) for mask (FFM (Airfit F30 mask?) or MOC) and supplies was provided today. Continue to clean mask and supplies weekly, and change supplies per insurance guidelines.     2. Continue to stay active.   3. Continue to f/u with PCP for BP management. Treated with Lisinopril.   4.  Continue to follow-up with PCP and symptoms are stable.  Patient is pending updated chest x-ray.  5. Sleep hygiene was reviewed. Recommend keeping a set sleep/wake schedule. Logging enough hours of sleep. Limiting/Avoiding naps. No caffeine after noon and no heavy meals in the evening.   6. Follow up with the appropriate healthcare practitioners for all other medical problems and issues.      Follow-up: Return in about 1 year (around 5/26/2022) for JONATHAN.or sooner if needed.     BRENDA Alvarez.    This dictation was created using voice recognition software. The accuracy of the dictation is limited to the abilities of the software. I expect there may be some errors of grammar and possibly content.

## 2021-06-23 ENCOUNTER — TELEPHONE (OUTPATIENT)
Dept: SURGERY | Facility: MEDICAL CENTER | Age: 61
End: 2021-06-23

## 2021-07-17 ENCOUNTER — HOSPITAL ENCOUNTER (OUTPATIENT)
Dept: LAB | Facility: MEDICAL CENTER | Age: 61
End: 2021-07-17
Attending: FAMILY MEDICINE
Payer: COMMERCIAL

## 2021-07-17 LAB
25(OH)D3 SERPL-MCNC: 56 NG/ML (ref 30–100)
ALBUMIN SERPL BCP-MCNC: 4.3 G/DL (ref 3.2–4.9)
ALBUMIN/GLOB SERPL: 2 G/DL
ALP SERPL-CCNC: 47 U/L (ref 30–99)
ALT SERPL-CCNC: 18 U/L (ref 2–50)
ANION GAP SERPL CALC-SCNC: 12 MMOL/L (ref 7–16)
AST SERPL-CCNC: 20 U/L (ref 12–45)
BASOPHILS # BLD AUTO: 0.4 % (ref 0–1.8)
BASOPHILS # BLD: 0.03 K/UL (ref 0–0.12)
BILIRUB SERPL-MCNC: 0.6 MG/DL (ref 0.1–1.5)
BUN SERPL-MCNC: 15 MG/DL (ref 8–22)
CALCIUM SERPL-MCNC: 9.1 MG/DL (ref 8.5–10.5)
CHLORIDE SERPL-SCNC: 105 MMOL/L (ref 96–112)
CO2 SERPL-SCNC: 24 MMOL/L (ref 20–33)
CREAT SERPL-MCNC: 1 MG/DL (ref 0.5–1.4)
CRP SERPL HS-MCNC: 1.2 MG/L (ref 0–7.5)
DHEA-S SERPL-MCNC: 224 UG/DL (ref 51.7–295)
EOSINOPHIL # BLD AUTO: 0.25 K/UL (ref 0–0.51)
EOSINOPHIL NFR BLD: 3.7 % (ref 0–6.9)
ERYTHROCYTE [DISTWIDTH] IN BLOOD BY AUTOMATED COUNT: 48 FL (ref 35.9–50)
FASTING STATUS PATIENT QL REPORTED: NORMAL
GLOBULIN SER CALC-MCNC: 2.2 G/DL (ref 1.9–3.5)
GLUCOSE SERPL-MCNC: 99 MG/DL (ref 65–99)
HCT VFR BLD AUTO: 52.3 % (ref 42–52)
HGB BLD-MCNC: 17.5 G/DL (ref 14–18)
IMM GRANULOCYTES # BLD AUTO: 0.02 K/UL (ref 0–0.11)
IMM GRANULOCYTES NFR BLD AUTO: 0.3 % (ref 0–0.9)
LYMPHOCYTES # BLD AUTO: 1.77 K/UL (ref 1–4.8)
LYMPHOCYTES NFR BLD: 26.5 % (ref 22–41)
MCH RBC QN AUTO: 30.2 PG (ref 27–33)
MCHC RBC AUTO-ENTMCNC: 33.5 G/DL (ref 33.7–35.3)
MCV RBC AUTO: 90.3 FL (ref 81.4–97.8)
MONOCYTES # BLD AUTO: 0.45 K/UL (ref 0–0.85)
MONOCYTES NFR BLD AUTO: 6.7 % (ref 0–13.4)
NEUTROPHILS # BLD AUTO: 4.15 K/UL (ref 1.82–7.42)
NEUTROPHILS NFR BLD: 62.4 % (ref 44–72)
NRBC # BLD AUTO: 0 K/UL
NRBC BLD-RTO: 0 /100 WBC
PLATELET # BLD AUTO: 251 K/UL (ref 164–446)
PMV BLD AUTO: 10.5 FL (ref 9–12.9)
POTASSIUM SERPL-SCNC: 4.3 MMOL/L (ref 3.6–5.5)
PROT SERPL-MCNC: 6.5 G/DL (ref 6–8.2)
RBC # BLD AUTO: 5.79 M/UL (ref 4.7–6.1)
SODIUM SERPL-SCNC: 141 MMOL/L (ref 135–145)
T3FREE SERPL-MCNC: 2.6 PG/ML (ref 2–4.4)
TSH SERPL DL<=0.005 MIU/L-ACNC: 2.31 UIU/ML (ref 0.38–5.33)
WBC # BLD AUTO: 6.7 K/UL (ref 4.8–10.8)

## 2021-07-17 PROCEDURE — 84305 ASSAY OF SOMATOMEDIN: CPT

## 2021-07-17 PROCEDURE — 85025 COMPLETE CBC W/AUTO DIFF WBC: CPT

## 2021-07-17 PROCEDURE — 84140 ASSAY OF PREGNENOLONE: CPT

## 2021-07-17 PROCEDURE — 82306 VITAMIN D 25 HYDROXY: CPT

## 2021-07-17 PROCEDURE — 84443 ASSAY THYROID STIM HORMONE: CPT

## 2021-07-17 PROCEDURE — 84482 T3 REVERSE: CPT

## 2021-07-17 PROCEDURE — 84270 ASSAY OF SEX HORMONE GLOBUL: CPT

## 2021-07-17 PROCEDURE — 84402 ASSAY OF FREE TESTOSTERONE: CPT

## 2021-07-17 PROCEDURE — 84403 ASSAY OF TOTAL TESTOSTERONE: CPT

## 2021-07-17 PROCEDURE — 86141 C-REACTIVE PROTEIN HS: CPT

## 2021-07-17 PROCEDURE — 80053 COMPREHEN METABOLIC PANEL: CPT

## 2021-07-17 PROCEDURE — 82670 ASSAY OF TOTAL ESTRADIOL: CPT

## 2021-07-17 PROCEDURE — 86376 MICROSOMAL ANTIBODY EACH: CPT

## 2021-07-17 PROCEDURE — 82627 DEHYDROEPIANDROSTERONE: CPT

## 2021-07-17 PROCEDURE — 36415 COLL VENOUS BLD VENIPUNCTURE: CPT

## 2021-07-17 PROCEDURE — 84481 FREE ASSAY (FT-3): CPT

## 2021-07-17 PROCEDURE — 86800 THYROGLOBULIN ANTIBODY: CPT

## 2021-07-19 LAB
ESTRADIOL SERPL-MCNC: 63 PG/ML
IGF-I SERPL-MCNC: 192 NG/ML (ref 55–203)
IGF-I Z-SCORE SERPL: 1.5
SHBG SERPL-SCNC: 29 NMOL/L (ref 11–80)
TESTOST FREE MFR SERPL: 2.2 % (ref 1.6–2.9)
TESTOST FREE SERPL-MCNC: 164 PG/ML (ref 47–244)
TESTOST SERPL-MCNC: 760 NG/DL (ref 300–890)
THYROGLOB AB SERPL-ACNC: <0.9 IU/ML (ref 0–4)
THYROPEROXIDASE AB SERPL-ACNC: 1.6 IU/ML (ref 0–9)

## 2021-07-22 LAB — T3REVERSE SERPL-MCNC: 14.9 NG/DL (ref 9–27)

## 2021-07-25 LAB — PREG SERPL-MCNC: 38 NG/DL (ref 23–173)

## 2021-11-01 RX ORDER — LISINOPRIL 20 MG/1
20 TABLET ORAL
Qty: 90 TABLET | Refills: 3 | Status: SHIPPED | OUTPATIENT
Start: 2021-11-01 | End: 2022-10-20

## 2021-11-01 RX ORDER — ANASTROZOLE 1 MG/1
TABLET ORAL
COMMUNITY
Start: 2021-10-15

## 2022-05-04 ENCOUNTER — TELEPHONE (OUTPATIENT)
Dept: MEDICAL GROUP | Facility: OTHER | Age: 62
End: 2022-05-04
Payer: COMMERCIAL

## 2022-05-04 NOTE — TELEPHONE ENCOUNTER
I called and spoke with pt, he stated that when he woke up this am, he had a very sore throat, headache, and productive cough, he states that his sx felt similar to when he was hospitalized 2 yrs ago with Covid.  He took 2 rapid covid tests today and both are negative and he states that this afternoon his sx have seemed to subside.  I gave him ER precautions and gave recommendations for management of sx. Just FYI

## 2022-10-20 RX ORDER — LISINOPRIL 20 MG/1
TABLET ORAL
Qty: 90 TABLET | Refills: 3 | Status: SHIPPED | OUTPATIENT
Start: 2022-10-20 | End: 2023-10-18

## 2023-02-10 ENCOUNTER — OFFICE VISIT (OUTPATIENT)
Dept: SLEEP MEDICINE | Facility: MEDICAL CENTER | Age: 63
End: 2023-02-10
Payer: COMMERCIAL

## 2023-02-10 VITALS
DIASTOLIC BLOOD PRESSURE: 84 MMHG | HEIGHT: 70 IN | WEIGHT: 242 LBS | BODY MASS INDEX: 34.65 KG/M2 | RESPIRATION RATE: 16 BRPM | HEART RATE: 69 BPM | SYSTOLIC BLOOD PRESSURE: 134 MMHG | OXYGEN SATURATION: 95 %

## 2023-02-10 DIAGNOSIS — I10 HYPERTENSION, UNSPECIFIED TYPE: ICD-10-CM

## 2023-02-10 DIAGNOSIS — G47.33 OBSTRUCTIVE SLEEP APNEA: ICD-10-CM

## 2023-02-10 PROBLEM — J96.01 ACUTE HYPOXEMIC RESPIRATORY FAILURE (HCC): Status: ACTIVE | Noted: 2020-12-14

## 2023-02-10 PROBLEM — R00.2 PALPITATIONS: Status: ACTIVE | Noted: 2020-12-28

## 2023-02-10 PROBLEM — M25.561 KNEE PAIN, RIGHT: Status: ACTIVE | Noted: 2019-05-29

## 2023-02-10 PROBLEM — U07.1 DISEASE DUE TO SEVERE ACUTE RESPIRATORY SYNDROME CORONAVIRUS 2 (SARS-COV-2): Status: ACTIVE | Noted: 2020-12-14

## 2023-02-10 PROBLEM — F41.9 ANXIETY DISORDER: Status: ACTIVE | Noted: 2023-02-10

## 2023-02-10 PROBLEM — F51.02 TRANSIENT INSOMNIA: Status: ACTIVE | Noted: 2020-12-14

## 2023-02-10 PROCEDURE — 99213 OFFICE O/P EST LOW 20 MIN: CPT | Performed by: NURSE PRACTITIONER

## 2023-02-10 ASSESSMENT — FIBROSIS 4 INDEX: FIB4 SCORE: 1.15

## 2023-02-10 ASSESSMENT — PATIENT HEALTH QUESTIONNAIRE - PHQ9: CLINICAL INTERPRETATION OF PHQ2 SCORE: 0

## 2023-02-10 NOTE — PROGRESS NOTES
Chief Complaint   Patient presents with    Follow-Up     JONATHAN - LAST SEEN ON 5/26/2021 - [place]        HPI:      Mr. Joe is a 61 y/o male patient who is in today for annual JONATHAN f/u, overdue. Former PMA patient.  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 PMH includes HTN, JONATHAN, Covid 19 positive in December of 2020 with Covid 19 pneumonia and again in August 2021, thyroid nodule, dyslipidemia, hypothyroidism, never smoker, T&A.    Patient has a ResMed auto CPAP.  Compliance report from 1/11/23-2/9/23 was downloaded and reviewed with the patient which showed autoCPAP 8-10 cmH2O, 97% compliance, 7 hrs 7 min use, AHI of 0.4, no mask leak. He is tolerating the pressure and mask well.  He denies morning headache or snoring.  He is sleeping better since being on CPAP therapy but does sometimes have nights when shutting off his brain.  He will go on his computer and play again which actually allows him to go back to sleep because he is able to shut off his brain.  He goes to bed between 10:30-11 pm and wakes up between 6:30-7 am.  He continues to stay active by working out 4-5 times a week by lifting weights. He continues to f/u with PCP for BP management and is taking lisinopril 20 mg qd.  States he tested positive again with COVID-19 in August 2021 and is not sure if he had the flu versus COVID around November 2022.  He denies having had fever but did have a headache for about 2 weeks.  Patient is requesting a prescription for travel CPAP today.  Patient's health insurance also changed today and he needs to switch [email or web address] companies.    Sleep/Card Study History:   Echo from 2/9/21 indicated normal chamber sizes. Normal left ventricular systolic function. Left ventricular ejection fraction is visually estimated to be 60%. Normal regional wall motion. No significant valve abnormalities. Estimated right ventricular systolic pressure  is 24 mmHg. Normal pericardium without effusion.    Chest x-ray  "from 2/9/21 indicated persistent left greater than right bilateral interstitial opacities consistent with history of Covid 19 pneumonia.    PSG split night study from 2/20/15 indicated an AHI of 21.7//hr and the best tolerated pressure was CPAP 8 cm with improved AHI of 0/hr and O2 kelin 90%.    ROS:    Constitutional: Denies fevers, Denies weight changes  Eyes: Denies changes in vision, no eye pain  Ears/Nose/Throat/Mouth: Denies nasal congestion or sore throat   Cardiovascular: Denies chest pain or palpitations   Respiratory: Denies shortness of breath , Denies cough  Gastrointestinal/Hepatic: Denies abdominal pain, nausea, vomiting,   Skin/Breast: Denies rash,   Neurological: Denies headache, confusion,   Psychiatric: denies mood disorder   Sleep: denies snoring, morning headache      Past Medical History:   Diagnosis Date    Anxiety disorder 2/10/2023    Chickenpox     COVID-19 12/2020    HTN (hypertension)     Hyperlipemia 3/31/2014    Hypothyroidism     Hypothyroidism 2014    Scarlet fever     Sleep apnea     Tear meniscus knee 2009    right repair    Thyroid nodule 2014    R \"incidental\" 2.1 mm    Tinnitus of both ears 3/28/2014       Past Surgical History:   Procedure Laterality Date    PB TOTAL KNEE ARTHROPLASTY Left 7/19/2021    Procedure: LEFT TOTAL KNEE ARTHROPLASTY;  Surgeon: Ramiro Calvo M.D.;  Location: [facility];  Service: Orthopedics    COLONOSCOPY  3/30/12    normal- done by [facility]    KNEE ARTHROSCOPY  10/5/2009    Performed by CHEPE FRANZ at SURGERY [facility]    MENISCECTOMY, KNEE, MEDIAL  10/5/2009    Performed by CHEPE [facility]ANZ at [facility]    FINGER EXPLORATION  7/2007    repair artery left index finger    ARTHROSCOPY, KNEE      INTUBATION      TONSILLECTOMY AND ADENOIDECTOMY         Family History   Problem Relation Age of Onset    Hyperlipidemia Mother     Hypertension Mother     Diabetes Mother         borderline DM    Hyperlipidemia Father  "    Hypertension Father     Cancer Paternal Grandmother         stomach cancer    Cancer Other     Sleep Apnea Son        Social History     Socioeconomic History    Marital status:      Spouse name: Not on file    Number of children: 4    Years of education: Not on file    Highest education level: Not on file   Occupational History    Occupation: district      Employer: OTHER   Tobacco Use    Smoking status: Never    Smokeless tobacco: Never   Vaping Use    Vaping Use: Never used   Substance and Sexual Activity    Alcohol use: Not Currently     Comment: occ    Drug use: No    Sexual activity: Yes     Partners: Male   Other Topics Concern    Not on file   Social History Narrative    ** Merged History Encounter **          Social Determinants of Health     Financial Resource Strain: Not on file   Food Insecurity: Not on file   Transportation Needs: Not on file   Physical Activity: Not on file   Stress: Not on file   Social Connections: Not on file   Intimate Partner Violence: Not on file   Housing Stability: Not on file       Allergies as of 02/10/2023 - Reviewed 02/10/2023   Allergen Reaction Noted    Nkda [no known drug allergy]  07/28/2007        Vitals:  Vitals:    02/10/23 1412   BP: 134/84   Pulse: 69   Resp: 16   SpO2: 95%       Current medications as of today   Current Outpatient Medications   Medication Sig Dispense Refill    lisinopril (PRINIVIL) 20 MG Tab TAKE 1 TABLET DAILY FOR BLOOD PRESSURE 90 Tablet 3    anastrozole (ARIMIDEX) 1 MG Tab       aspirin 81 MG EC tablet Take 1 tablet by mouth 2 times a day with meals. 90 tablet 0    NON SPECIFIED Take 1 Cap by mouth every day. Cumin supplement      Pseudoephedrine-APAP-DM (DAYQUIL PO) Take 30 mL by mouth 1 time a day as needed.      multivitamin (THERAGRAN) Tab Take 1 Tab by mouth every day.      ARMOUR THYROID 15 MG Tab Take 15 mg by mouth every day. Take with 60 mg tab for a total dose of 75 mg      ARMOUR THYROID 60 MG Tab Take 60 mg by mouth  every day. Take with 15 mg tab for a total dose of 75 mg      OMEGA 3 PO Take 1 Cap by mouth every day. Takes two      traZODone (DESYREL) 50 MG Tab Take 1 tablet by mouth at bedtime. (Patient not taking: Reported on 2/10/2023) 30 tablet 0    Barberry-Oreg Grape-Goldenseal (BERBERINE COMPLEX PO) Take 1 Cap by mouth every day. (Patient not taking: Reported on 2/10/2023)       No current facility-administered medications for this visit.         Physical Exam: Limited by COVID-19 precautions.  Appearance: Well developed, well nourished, no acute distress  Eyes: PERRL, EOM intact, sclera white, conjunctiva moist  Ears: no lesions or deformities  Hearing: grossly intact  Nose: no lesions or deformities  Respiratory effort: no intercostal retractions or use of accessory muscles  Extremities: no cyanosis or edema  Abdomen: soft   Gait and Station: normal  Digits and nails: no clubbing, cyanosis, petechiae or nodes.  Cranial nerves: grossly intact  Skin: no visible rashes, lesions or ulcers noted  Orientation: Oriented to time, person and place  Mood and affect: mood and affect appropriate, normal interaction with examiner  Judgement: Intact    Assessment:  1. Obstructive sleep apnea  DME Mask and Supplies    DME CPAP    CANCELED: DME Mask and Supplies      2. Hypertension, unspecified type        3. BMI 34.0-34.9,adult  Patient identified as having weight management issue.  Appropriate orders and counseling given.            Plan  Discussed the cardiovascular and neuropsychiatric risks of untreated JONATHAN; including but not limited to: HTN, DM, MI, ASCVD, CVA, CHF, traffic accidents.     1.  Compliance report from 1/11/23-2/9/23 was downloaded and reviewed with the patient which showed autoCPAP 8-10 cmH2O, 97% compliance, 7 hrs 7 min use, AHI of 0.4, no mask leak.  Patient is compliant and benefiting from autoCPAP therapy for management of JONATHAN. Advised patient to continue to use the autoCPAP every night for more than four  hours for optimal health benefit.    *DME order ([employer]) for mask (medium Airfit F30i mask or MOC) and supplies was placed today. Continue to clean mask and supplies weekly with soap and water, and change supplies per insurance guidelines.  Switch DME from [employer] to [employer] due to health insurance change.    *Order for Travel autoCPAP 8-10 cmH2O is placed today per patient's request.     *Sleep hygiene discussed. Recommend keeping a set sleep/wake schedule. Logging enough hours of sleep. Limiting/Avoiding naps. No caffeine after noon and no heavy meals in the evening.     2. Continue to f/u with PCP for BP management.  3. Continue to stay active.   If your BMI is 25-29.9 you are overweight. If your BMI is 30 or greater you are obese. To lose weight eat less, move more, or both. Any diet that reduces caloric intake can help with weight loss. Extra weight may reduce your lifespan. Avoid dramatic unsustainable dietary changes that result in the yo-yo effect (down then back up.)  Usually small modifications in diet and exercise are easier to stick with.  4. Follow up with appropriate healthcare providers for all other medical problems.  5. F/u in 1 year for JONATHAN, sooner if needed.       BRENDA Alvarez.      This dictation was created using voice recognition software. The accuracy of the dictation is limited to the abilities of the software. I expect there may be some errors of grammar and possibly content.

## 2023-08-22 SDOH — ECONOMIC STABILITY: INCOME INSECURITY: HOW HARD IS IT FOR YOU TO PAY FOR THE VERY BASICS LIKE FOOD, HOUSING, MEDICAL CARE, AND HEATING?: NOT HARD AT ALL

## 2023-08-22 SDOH — ECONOMIC STABILITY: FOOD INSECURITY: WITHIN THE PAST 12 MONTHS, YOU WORRIED THAT YOUR FOOD WOULD RUN OUT BEFORE YOU GOT MONEY TO BUY MORE.: NEVER TRUE

## 2023-08-22 SDOH — ECONOMIC STABILITY: HOUSING INSECURITY: IN THE LAST 12 MONTHS, HOW MANY PLACES HAVE YOU LIVED?: 1

## 2023-08-22 SDOH — ECONOMIC STABILITY: TRANSPORTATION INSECURITY
IN THE PAST 12 MONTHS, HAS LACK OF TRANSPORTATION KEPT YOU FROM MEETINGS, WORK, OR FROM GETTING THINGS NEEDED FOR DAILY LIVING?: NO

## 2023-08-22 SDOH — ECONOMIC STABILITY: FOOD INSECURITY: WITHIN THE PAST 12 MONTHS, THE FOOD YOU BOUGHT JUST DIDN'T LAST AND YOU DIDN'T HAVE MONEY TO GET MORE.: NEVER TRUE

## 2023-08-22 SDOH — HEALTH STABILITY: PHYSICAL HEALTH: ON AVERAGE, HOW MANY MINUTES DO YOU ENGAGE IN EXERCISE AT THIS LEVEL?: 60 MIN

## 2023-08-22 SDOH — ECONOMIC STABILITY: INCOME INSECURITY: IN THE LAST 12 MONTHS, WAS THERE A TIME WHEN YOU WERE NOT ABLE TO PAY THE MORTGAGE OR RENT ON TIME?: NO

## 2023-08-22 SDOH — HEALTH STABILITY: PHYSICAL HEALTH: ON AVERAGE, HOW MANY DAYS PER WEEK DO YOU ENGAGE IN MODERATE TO STRENUOUS EXERCISE (LIKE A BRISK WALK)?: 5 DAYS

## 2023-08-22 SDOH — HEALTH STABILITY: MENTAL HEALTH
STRESS IS WHEN SOMEONE FEELS TENSE, NERVOUS, ANXIOUS, OR CAN'T SLEEP AT NIGHT BECAUSE THEIR MIND IS TROUBLED. HOW STRESSED ARE YOU?: TO SOME EXTENT

## 2023-08-22 SDOH — ECONOMIC STABILITY: HOUSING INSECURITY
IN THE LAST 12 MONTHS, WAS THERE A TIME WHEN YOU DID NOT HAVE A STEADY PLACE TO SLEEP OR SLEPT IN A SHELTER (INCLUDING NOW)?: NO

## 2023-08-22 SDOH — ECONOMIC STABILITY: TRANSPORTATION INSECURITY
IN THE PAST 12 MONTHS, HAS LACK OF RELIABLE TRANSPORTATION KEPT YOU FROM MEDICAL APPOINTMENTS, MEETINGS, WORK OR FROM GETTING THINGS NEEDED FOR DAILY LIVING?: NO

## 2023-08-22 SDOH — ECONOMIC STABILITY: TRANSPORTATION INSECURITY
IN THE PAST 12 MONTHS, HAS THE LACK OF TRANSPORTATION KEPT YOU FROM MEDICAL APPOINTMENTS OR FROM GETTING MEDICATIONS?: NO

## 2023-08-22 ASSESSMENT — LIFESTYLE VARIABLES
HOW OFTEN DO YOU HAVE SIX OR MORE DRINKS ON ONE OCCASION: NEVER
HOW OFTEN DO YOU HAVE A DRINK CONTAINING ALCOHOL: MONTHLY OR LESS
AUDIT-C TOTAL SCORE: 1
SKIP TO QUESTIONS 9-10: 1
HOW MANY STANDARD DRINKS CONTAINING ALCOHOL DO YOU HAVE ON A TYPICAL DAY: 1 OR 2

## 2023-08-22 ASSESSMENT — SOCIAL DETERMINANTS OF HEALTH (SDOH)
HOW HARD IS IT FOR YOU TO PAY FOR THE VERY BASICS LIKE FOOD, HOUSING, MEDICAL CARE, AND HEATING?: NOT HARD AT ALL
IN A TYPICAL WEEK, HOW MANY TIMES DO YOU TALK ON THE PHONE WITH FAMILY, FRIENDS, OR NEIGHBORS?: MORE THAN THREE TIMES A WEEK
HOW OFTEN DO YOU ATTENT MEETINGS OF THE CLUB OR ORGANIZATION YOU BELONG TO?: MORE THAN 4 TIMES PER YEAR
WITHIN THE PAST 12 MONTHS, YOU WORRIED THAT YOUR FOOD WOULD RUN OUT BEFORE YOU GOT THE MONEY TO BUY MORE: NEVER TRUE
DO YOU BELONG TO ANY CLUBS OR ORGANIZATIONS SUCH AS CHURCH GROUPS UNIONS, FRATERNAL OR ATHLETIC GROUPS, OR SCHOOL GROUPS?: YES
HOW OFTEN DO YOU HAVE A DRINK CONTAINING ALCOHOL: MONTHLY OR LESS
HOW OFTEN DO YOU GET TOGETHER WITH FRIENDS OR RELATIVES?: MORE THAN THREE TIMES A WEEK
DO YOU BELONG TO ANY CLUBS OR ORGANIZATIONS SUCH AS CHURCH GROUPS UNIONS, FRATERNAL OR ATHLETIC GROUPS, OR SCHOOL GROUPS?: YES
HOW OFTEN DO YOU ATTENT MEETINGS OF THE CLUB OR ORGANIZATION YOU BELONG TO?: MORE THAN 4 TIMES PER YEAR
IN A TYPICAL WEEK, HOW MANY TIMES DO YOU TALK ON THE PHONE WITH FAMILY, FRIENDS, OR NEIGHBORS?: MORE THAN THREE TIMES A WEEK
HOW MANY DRINKS CONTAINING ALCOHOL DO YOU HAVE ON A TYPICAL DAY WHEN YOU ARE DRINKING: 1 OR 2
HOW OFTEN DO YOU GET TOGETHER WITH FRIENDS OR RELATIVES?: MORE THAN THREE TIMES A WEEK
HOW OFTEN DO YOU ATTEND CHURCH OR RELIGIOUS SERVICES?: NEVER
HOW OFTEN DO YOU ATTEND CHURCH OR RELIGIOUS SERVICES?: NEVER
HOW OFTEN DO YOU HAVE SIX OR MORE DRINKS ON ONE OCCASION: NEVER

## 2023-08-23 ENCOUNTER — OFFICE VISIT (OUTPATIENT)
Dept: MEDICAL GROUP | Facility: OTHER | Age: 63
End: 2023-08-23
Payer: COMMERCIAL

## 2023-08-23 VITALS
HEIGHT: 70 IN | DIASTOLIC BLOOD PRESSURE: 80 MMHG | BODY MASS INDEX: 35.22 KG/M2 | RESPIRATION RATE: 16 BRPM | SYSTOLIC BLOOD PRESSURE: 132 MMHG | WEIGHT: 246 LBS

## 2023-08-23 DIAGNOSIS — Z12.11 SCREENING FOR COLON CANCER: ICD-10-CM

## 2023-08-23 DIAGNOSIS — Z11.59 NEED FOR HEPATITIS C SCREENING TEST: ICD-10-CM

## 2023-08-23 DIAGNOSIS — E04.1 THYROID NODULE: ICD-10-CM

## 2023-08-23 DIAGNOSIS — I10 BENIGN ESSENTIAL HYPERTENSION: ICD-10-CM

## 2023-08-23 DIAGNOSIS — E78.5 DYSLIPIDEMIA: ICD-10-CM

## 2023-08-23 DIAGNOSIS — E03.9 HYPOTHYROIDISM, UNSPECIFIED TYPE: ICD-10-CM

## 2023-08-23 PROBLEM — Z00.00 ENCOUNTER FOR ANNUAL GENERAL MEDICAL EXAMINATION WITHOUT ABNORMAL FINDINGS IN ADULT: Status: ACTIVE | Noted: 2023-08-23

## 2023-08-23 PROCEDURE — 3079F DIAST BP 80-89 MM HG: CPT | Performed by: FAMILY MEDICINE

## 2023-08-23 PROCEDURE — 3075F SYST BP GE 130 - 139MM HG: CPT | Performed by: FAMILY MEDICINE

## 2023-08-23 PROCEDURE — 99396 PREV VISIT EST AGE 40-64: CPT | Performed by: FAMILY MEDICINE

## 2023-08-23 NOTE — PATIENT INSTRUCTIONS
Current with Colonoscopy.     Labs ordered, including Hep C screen.    Discussed healthy lifestyle.     Exercise: 150 minutes a week of vigorous exercise is recommended for good health.     Recommend see us annually at a minimum.

## 2023-08-23 NOTE — ASSESSMENT & PLAN NOTE
Continue Lisinopril daily.   May refill this maintenance medication for one year as needed.    Exercise: 150 minutes a week of vigorous exercise is recommended for good health.

## 2023-08-23 NOTE — ASSESSMENT & PLAN NOTE
He is current with Colonoscopy.   Labs ordered, including Hep C screen.  Discussed healthy lifestyle.   Exercise: 150 minutes a week of vigorous exercise is recommended for good health.   Recommend see us annually at a minimum.

## 2023-08-23 NOTE — PROGRESS NOTES
"[facility] MEDICINE     PATIENT ID:  NAME:  Brenton Joe  MRN:               [id number]  YOB: 1960    Date: 1:13 PM      CC:  Annual checkup      HPI: Brenton Joe is a 62 y.o. male who presented with these concerns:     Problem   Encounter for Annual General Medical Examination Without Abnormal Findings in Adult    Here for annual medical exam.  Last seen almost three years ago, 12/2020.  Last Colonoscopy was in 2021 he believes at [facility].  Last labs two years ago.  Has a known thyroid nodule, last sonogram study maybe 7 years ago.  He exercises regularly.     Nonsmoker.     Denies using alcohol or drugs.      Benign Essential Hypertension    Takes Lisinopril daily for hypertension.  Sressful job as a .      Dyslipidemia    Lipids have been elevated in past.  Not on an medications for this.      Thyroid Nodule    History of a right lobe \"incidental\" 2.1 mm nodule.  Has not had any follow-up ultrasound studies.        Works as a .     REVIEW OF SYSTEMS:   Ten systems reviewed and were negative except as noted in the HPI.                PROBLEM LIST  Patient Active Problem List   Diagnosis    Thyroid nodule    Obstructive sleep apnea    Hypothyroidism    Dyslipidemia    Acute hypoxemic respiratory failure (HCC)    Anxiety disorder    Benign essential hypertension    Constipation    Disease due to severe acute respiratory syndrome coronavirus 2 (SARS-CoV-2)    Family history of diabetes mellitus    Knee pain, right    Palpitations    Transient insomnia    Encounter for annual general medical examination without abnormal findings in adult        PAST SURGICAL HISTORY:  Past Surgical History:   Procedure Laterality Date    PB TOTAL KNEE ARTHROPLASTY Left 7/19/2021    Procedure: LEFT TOTAL KNEE ARTHROPLASTY;  Surgeon: Ramiro Calvo M.D.;  Location: [facility];  Service: Orthopedics    COLONOSCOPY  3/30/12    normal- done by [facility]    KNEE ARTHROSCOPY  " 10/5/2009    Performed by CHEPE FRANZ at SURGERY [facility]    MENISCECTOMY, KNEE, MEDIAL  10/5/2009    Performed by CHEPE FRANZ at SURGERY [facility]    FINGER EXPLORATION  7/2007    repair artery left index finger    ARTHROSCOPY, KNEE      INTUBATION      TONSILLECTOMY AND ADENOIDECTOMY         FAMILY HISTORY:  Family History   Problem Relation Age of Onset    Hyperlipidemia Mother     Hypertension Mother     Diabetes Mother         borderline DM    Hyperlipidemia Father     Hypertension Father     Cancer Paternal Grandmother         stomach cancer    Cancer Other     Sleep Apnea Son        SOCIAL HISTORY:   Social History     Tobacco Use    Smoking status: Never    Smokeless tobacco: Never   Substance Use Topics    Alcohol use: Not Currently     Comment: occ       ALLERGIES:  Allergies   Allergen Reactions    Nkda [No Known Drug Allergy]        OUTPATIENT MEDICATIONS:    Current Outpatient Medications:     lisinopril (PRINIVIL) 20 MG Tab, TAKE 1 TABLET DAILY FOR BLOOD PRESSURE, Disp: 90 Tablet, Rfl: 3    anastrozole (ARIMIDEX) 1 MG Tab, , Disp: , Rfl:     aspirin 81 MG EC tablet, Take 1 tablet by mouth 2 times a day with meals., Disp: 90 tablet, Rfl: 0    NON SPECIFIED, Take 1 Cap by mouth every day. Cumin supplement, Disp: , Rfl:     Pseudoephedrine-APAP-DM (DAYQUIL PO), Take 30 mL by mouth 1 time a day as needed., Disp: , Rfl:     multivitamin (THERAGRAN) Tab, Take 1 Tab by mouth every day., Disp: , Rfl:     ARMOUR THYROID 15 MG Tab, Take 15 mg by mouth every day. Take with 60 mg tab for a total dose of 75 mg, Disp: , Rfl:     ARMOUR THYROID 60 MG Tab, Take 60 mg by mouth every day. Take with 15 mg tab for a total dose of 75 mg, Disp: , Rfl:     OMEGA 3 PO, Take 1 Cap by mouth every day. Takes two, Disp: , Rfl:     PHYSICAL EXAM:  Vitals:    08/23/23 0804   BP: 132/80   BP Location: Left arm   Patient Position: Sitting   BP Cuff Size: Large adult   Resp: 16   Weight: 112 kg (246 lb)  "  Height: 1.778 m (5' 10\")       General: Pt resting in NAD, cooperative   Skin:  Pink, warm and dry.  HEENT: NC/AT. EOMI. Sclerae anicteric.   Lungs:  Symmetrical.  CTAB, good air movement.  Cardiovascular:  normal S1/S2, RRR without murmur or gallop.   Abdomen:  Abdomen is soft, nontender, no masses or organomegaly appreciated.   Extremities:  Full range of motion.  CNS:  Muscle tone is normal. No gross focal neurologic deficits      ASSESSMENT/PLAN:   62 y.o. male     Problem List Items Addressed This Visit       Benign essential hypertension     Continue Lisinopril daily.   May refill this maintenance medication for one year as needed.    Exercise: 150 minutes a week of vigorous exercise is recommended for good health.              Relevant Orders    Comp Metabolic Panel    CBC WITH DIFFERENTIAL    Dyslipidemia     Lipid panel ordered.   Healthy lifestyle discussed.          Relevant Orders    Lipid Profile    Encounter for annual general medical examination without abnormal findings in adult     He is current with Colonoscopy.   Labs ordered, including Hep C screen.  Discussed healthy lifestyle.   Exercise: 150 minutes a week of vigorous exercise is recommended for good health.   Recommend see us annually at a minimum.              Hypothyroidism    Relevant Orders    TSH WITH REFLEX TO FT4    US-THYROID    Thyroid nodule     Thyroid U/S study ordered for surveillance.          Relevant Orders    US-THYROID     Other Visit Diagnoses       Need for hepatitis C screening test        Relevant Orders    HCV Scrn ( [id number] [employer])          Annual follow-up.     Heriberto Tsang MD  [facility]     "

## 2023-10-13 ENCOUNTER — HOSPITAL ENCOUNTER (OUTPATIENT)
Dept: RADIOLOGY | Facility: MEDICAL CENTER | Age: 63
End: 2023-10-13
Attending: FAMILY MEDICINE
Payer: COMMERCIAL

## 2023-10-13 DIAGNOSIS — E03.9 HYPOTHYROIDISM, UNSPECIFIED TYPE: ICD-10-CM

## 2023-10-13 DIAGNOSIS — E04.1 THYROID NODULE: ICD-10-CM

## 2023-10-13 PROCEDURE — 76536 US EXAM OF HEAD AND NECK: CPT

## 2023-10-18 RX ORDER — LISINOPRIL 20 MG/1
TABLET ORAL
Qty: 90 TABLET | Refills: 3 | Status: SHIPPED | OUTPATIENT
Start: 2023-10-18

## 2023-12-13 ENCOUNTER — HOSPITAL ENCOUNTER (OUTPATIENT)
Dept: LAB | Facility: MEDICAL CENTER | Age: 63
End: 2023-12-13
Attending: FAMILY MEDICINE
Payer: COMMERCIAL

## 2023-12-13 LAB
25(OH)D3 SERPL-MCNC: 54 NG/ML (ref 30–100)
ALBUMIN SERPL BCP-MCNC: 4.2 G/DL (ref 3.2–4.9)
ALBUMIN/GLOB SERPL: 1.6 G/DL
ALP SERPL-CCNC: 65 U/L (ref 30–99)
ALT SERPL-CCNC: 46 U/L (ref 2–50)
ANION GAP SERPL CALC-SCNC: 11 MMOL/L (ref 7–16)
AST SERPL-CCNC: 42 U/L (ref 12–45)
BASOPHILS # BLD AUTO: 1 % (ref 0–1.8)
BASOPHILS # BLD: 0.07 K/UL (ref 0–0.12)
BILIRUB SERPL-MCNC: 0.6 MG/DL (ref 0.1–1.5)
BUN SERPL-MCNC: 15 MG/DL (ref 8–22)
CALCIUM ALBUM COR SERPL-MCNC: 9.2 MG/DL (ref 8.5–10.5)
CALCIUM SERPL-MCNC: 9.4 MG/DL (ref 8.5–10.5)
CHLORIDE SERPL-SCNC: 105 MMOL/L (ref 96–112)
CHOLEST SERPL-MCNC: 295 MG/DL (ref 100–199)
CO2 SERPL-SCNC: 27 MMOL/L (ref 20–33)
CREAT SERPL-MCNC: 1.15 MG/DL (ref 0.5–1.4)
CRP SERPL HS-MCNC: 1.4 MG/L (ref 0–3)
DHEA-S SERPL-MCNC: 687 UG/DL (ref 51.7–295)
EOSINOPHIL # BLD AUTO: 0.29 K/UL (ref 0–0.51)
EOSINOPHIL NFR BLD: 4.3 % (ref 0–6.9)
ERYTHROCYTE [DISTWIDTH] IN BLOOD BY AUTOMATED COUNT: 47.6 FL (ref 35.9–50)
EST. AVERAGE GLUCOSE BLD GHB EST-MCNC: 123 MG/DL
ESTRADIOL SERPL-MCNC: 7.4 PG/ML
FASTING STATUS PATIENT QL REPORTED: NORMAL
FERRITIN SERPL-MCNC: 75.3 NG/ML (ref 22–322)
FOLATE SERPL-MCNC: 3.5 NG/ML
GFR SERPLBLD CREATININE-BSD FMLA CKD-EPI: 72 ML/MIN/1.73 M 2
GLOBULIN SER CALC-MCNC: 2.6 G/DL (ref 1.9–3.5)
GLUCOSE SERPL-MCNC: 81 MG/DL (ref 65–99)
HBA1C MFR BLD: 5.9 % (ref 4–5.6)
HCT VFR BLD AUTO: 54 % (ref 42–52)
HCYS SERPL-SCNC: 34.69 UMOL/L
HDLC SERPL-MCNC: 32 MG/DL
HGB BLD-MCNC: 17.2 G/DL (ref 14–18)
IMM GRANULOCYTES # BLD AUTO: 0.02 K/UL (ref 0–0.11)
IMM GRANULOCYTES NFR BLD AUTO: 0.3 % (ref 0–0.9)
LDLC SERPL CALC-MCNC: 236 MG/DL
LYMPHOCYTES # BLD AUTO: 2.43 K/UL (ref 1–4.8)
LYMPHOCYTES NFR BLD: 36.1 % (ref 22–41)
MCH RBC QN AUTO: 29.4 PG (ref 27–33)
MCHC RBC AUTO-ENTMCNC: 31.9 G/DL (ref 32.3–36.5)
MCV RBC AUTO: 92.3 FL (ref 81.4–97.8)
MONOCYTES # BLD AUTO: 0.52 K/UL (ref 0–0.85)
MONOCYTES NFR BLD AUTO: 7.7 % (ref 0–13.4)
NEUTROPHILS # BLD AUTO: 3.41 K/UL (ref 1.82–7.42)
NEUTROPHILS NFR BLD: 50.6 % (ref 44–72)
NRBC # BLD AUTO: 0 K/UL
NRBC BLD-RTO: 0 /100 WBC (ref 0–0.2)
PLATELET # BLD AUTO: 378 K/UL (ref 164–446)
PMV BLD AUTO: 10 FL (ref 9–12.9)
POTASSIUM SERPL-SCNC: 5.2 MMOL/L (ref 3.6–5.5)
PROLACTIN SERPL-MCNC: 6.61 NG/ML (ref 2.1–17.7)
PROT SERPL-MCNC: 6.8 G/DL (ref 6–8.2)
PSA SERPL-MCNC: 0.65 NG/ML (ref 0–4)
RBC # BLD AUTO: 5.85 M/UL (ref 4.7–6.1)
SODIUM SERPL-SCNC: 143 MMOL/L (ref 135–145)
T3FREE SERPL-MCNC: 4.02 PG/ML (ref 2–4.4)
T4 FREE SERPL-MCNC: 1.12 NG/DL (ref 0.93–1.7)
THYROPEROXIDASE AB SERPL-ACNC: 11 IU/ML (ref 0–9)
TRIGL SERPL-MCNC: 134 MG/DL (ref 0–149)
TSH SERPL DL<=0.005 MIU/L-ACNC: 1.09 UIU/ML (ref 0.38–5.33)
VIT B12 SERPL-MCNC: 1386 PG/ML (ref 211–911)
WBC # BLD AUTO: 6.7 K/UL (ref 4.8–10.8)

## 2023-12-13 PROCEDURE — 86141 C-REACTIVE PROTEIN HS: CPT

## 2023-12-13 PROCEDURE — 83525 ASSAY OF INSULIN: CPT

## 2023-12-13 PROCEDURE — 84153 ASSAY OF PSA TOTAL: CPT

## 2023-12-13 PROCEDURE — 84402 ASSAY OF FREE TESTOSTERONE: CPT

## 2023-12-13 PROCEDURE — 84305 ASSAY OF SOMATOMEDIN: CPT

## 2023-12-13 PROCEDURE — 84482 T3 REVERSE: CPT

## 2023-12-13 PROCEDURE — 86800 THYROGLOBULIN ANTIBODY: CPT

## 2023-12-13 PROCEDURE — 82627 DEHYDROEPIANDROSTERONE: CPT

## 2023-12-13 PROCEDURE — 80061 LIPID PANEL: CPT

## 2023-12-13 PROCEDURE — 82728 ASSAY OF FERRITIN: CPT

## 2023-12-13 PROCEDURE — 84146 ASSAY OF PROLACTIN: CPT

## 2023-12-13 PROCEDURE — 80053 COMPREHEN METABOLIC PANEL: CPT

## 2023-12-13 PROCEDURE — 86376 MICROSOMAL ANTIBODY EACH: CPT

## 2023-12-13 PROCEDURE — 84270 ASSAY OF SEX HORMONE GLOBUL: CPT

## 2023-12-13 PROCEDURE — 82746 ASSAY OF FOLIC ACID SERUM: CPT

## 2023-12-13 PROCEDURE — 83036 HEMOGLOBIN GLYCOSYLATED A1C: CPT

## 2023-12-13 PROCEDURE — 82607 VITAMIN B-12: CPT

## 2023-12-13 PROCEDURE — 83090 ASSAY OF HOMOCYSTEINE: CPT

## 2023-12-13 PROCEDURE — 36415 COLL VENOUS BLD VENIPUNCTURE: CPT

## 2023-12-13 PROCEDURE — 84439 ASSAY OF FREE THYROXINE: CPT

## 2023-12-13 PROCEDURE — 82306 VITAMIN D 25 HYDROXY: CPT

## 2023-12-13 PROCEDURE — 82670 ASSAY OF TOTAL ESTRADIOL: CPT

## 2023-12-13 PROCEDURE — 84403 ASSAY OF TOTAL TESTOSTERONE: CPT

## 2023-12-13 PROCEDURE — 84443 ASSAY THYROID STIM HORMONE: CPT

## 2023-12-13 PROCEDURE — 85025 COMPLETE CBC W/AUTO DIFF WBC: CPT

## 2023-12-13 PROCEDURE — 84140 ASSAY OF PREGNENOLONE: CPT

## 2023-12-13 PROCEDURE — 84481 FREE ASSAY (FT-3): CPT

## 2023-12-15 LAB
IGF-I SERPL-MCNC: 163 NG/ML (ref 49–214)
IGF-I Z-SCORE SERPL: 1
INSULIN P FAST SERPL-ACNC: 8 UIU/ML (ref 3–25)
SHBG SERPL-SCNC: 13 NMOL/L (ref 19–76)
TESTOST FREE MFR SERPL: 2.9 % (ref 1.6–2.9)
TESTOST FREE SERPL-MCNC: 267 PG/ML (ref 47–244)
TESTOST SERPL-MCNC: 924 NG/DL (ref 300–720)
THYROGLOB AB SERPL-ACNC: <0.9 IU/ML (ref 0–4)

## 2023-12-17 LAB — PREG SERPL-MCNC: 49 NG/DL (ref 23–173)

## 2023-12-18 LAB — T3REVERSE SERPL-MCNC: 13.2 NG/DL (ref 9–27)

## 2024-10-14 RX ORDER — LISINOPRIL 20 MG/1
20 TABLET ORAL DAILY
Qty: 90 TABLET | Refills: 0 | Status: SHIPPED | OUTPATIENT
Start: 2024-10-14 | End: 2025-01-12

## 2024-10-16 ENCOUNTER — APPOINTMENT (OUTPATIENT)
Dept: SLEEP MEDICINE | Facility: MEDICAL CENTER | Age: 64
End: 2024-10-16
Attending: PHYSICIAN ASSISTANT
Payer: COMMERCIAL

## 2024-11-06 ENCOUNTER — APPOINTMENT (OUTPATIENT)
Dept: MEDICAL GROUP | Facility: OTHER | Age: 64
End: 2024-11-06
Payer: COMMERCIAL

## 2024-11-06 VITALS
RESPIRATION RATE: 18 BRPM | BODY MASS INDEX: 34.79 KG/M2 | HEIGHT: 70 IN | WEIGHT: 243 LBS | HEART RATE: 76 BPM | OXYGEN SATURATION: 96 % | DIASTOLIC BLOOD PRESSURE: 94 MMHG | TEMPERATURE: 97.5 F | SYSTOLIC BLOOD PRESSURE: 142 MMHG

## 2024-11-06 DIAGNOSIS — I10 BENIGN ESSENTIAL HYPERTENSION: ICD-10-CM

## 2024-11-06 DIAGNOSIS — R73.03 PREDIABETES: ICD-10-CM

## 2024-11-06 DIAGNOSIS — E03.9 HYPOTHYROIDISM, UNSPECIFIED TYPE: ICD-10-CM

## 2024-11-06 DIAGNOSIS — E04.1 THYROID NODULE: ICD-10-CM

## 2024-11-06 DIAGNOSIS — E78.5 DYSLIPIDEMIA: ICD-10-CM

## 2024-11-06 PROCEDURE — 3077F SYST BP >= 140 MM HG: CPT | Performed by: FAMILY MEDICINE

## 2024-11-06 PROCEDURE — 99214 OFFICE O/P EST MOD 30 MIN: CPT | Performed by: FAMILY MEDICINE

## 2024-11-06 PROCEDURE — 3080F DIAST BP >= 90 MM HG: CPT | Performed by: FAMILY MEDICINE

## 2024-11-06 RX ORDER — LISINOPRIL 30 MG/1
30 TABLET ORAL DAILY
Qty: 90 TABLET | Refills: 4 | Status: SHIPPED | OUTPATIENT
Start: 2024-11-06

## 2024-11-06 ASSESSMENT — PATIENT HEALTH QUESTIONNAIRE - PHQ9: CLINICAL INTERPRETATION OF PHQ2 SCORE: 0

## 2024-11-06 ASSESSMENT — FIBROSIS 4 INDEX: FIB4 SCORE: 1.02

## 2024-11-06 NOTE — PATIENT INSTRUCTIONS
Get labs over next couple months.,     Exercise: 150 minutes a week of vigorous exercise is recommended for good health.     Nutrition consult.     Increase blood pressure medication to 30 mg daily.

## 2024-11-06 NOTE — PROGRESS NOTES
"[facility] MEDICINE     PATIENT ID:  NAME:  Brenton Joe  MRN:               [id number]  YOB: 1960    Date: 10:37 AM      CC:  check-up, blood pressure      HPI: Brenton Joe is a 63 y.o. male who presented with these concerns:    Problem   Prediabetes    Last A1c was 5.9% last December.  His mother has Type 2 diabetes and she is on GLP-1 medication.     Benign Essential Hypertension    Takes Lisinopril 20 mg daily for hypertension.  Has a pretty stressful job as state district .  He has run high at dental visits and usually is around 140-150/80s it seems.  Not checking his pressure at home.       Hypothyroidism    Has been on [place] thyroid for about 7 years he thinks.  nO recent thyroid studies.  Has a small thyroid nodule per history.      Dyslipidemia    Lipids have been quite elevated in past.  Not on an medications for this.  Last labs 12/13/23: LDL was 236, Total cholesterol 295.  Diet is pretty decent, however.  Saw [facility] program in remote past and it was helpful for him.      Thyroid Nodule    History of a right lobe \"incidental\" 2.1 mm nodule.  Has not had any follow-up ultrasound studies.          REVIEW OF SYSTEMS:   Ten systems reviewed and were negative except as noted in the HPI.                PROBLEM LIST  Patient Active Problem List   Diagnosis    Thyroid nodule    Obstructive sleep apnea    Hypothyroidism    Dyslipidemia    Acute hypoxemic respiratory failure (HCC)    Anxiety disorder    Benign essential hypertension    Constipation    Disease due to severe acute respiratory syndrome coronavirus 2 (SARS-CoV-2)    Family history of diabetes mellitus    Knee pain, right    Palpitations    Transient insomnia    Encounter for annual general medical examination without abnormal findings in adult    Prediabetes        PAST SURGICAL HISTORY:  Past Surgical History:   Procedure Laterality Date    PB TOTAL KNEE ARTHROPLASTY Left 7/19/2021    Procedure: LEFT TOTAL KNEE " ARTHROPLASTY;  Surgeon: Ramiro Calvo M.D.;  Location: [facility] [place];  Service: Orthopedics    COLONOSCOPY  3/30/12    normal- done by [facility]    KNEE ARTHROSCOPY  10/5/2009    Performed by CHEPE FRANZ at SURGERY [facility]    MENISCECTOMY, KNEE, MEDIAL  10/5/2009    Performed by CHEPE FRANZ at SURGERY [facility]    FINGER EXPLORATION  7/2007    repair artery left index finger    ARTHROSCOPY, KNEE      INTUBATION      TONSILLECTOMY AND ADENOIDECTOMY         FAMILY HISTORY:  Family History   Problem Relation Age of Onset    Hyperlipidemia Mother     Hypertension Mother     Diabetes Mother         borderline DM    Hyperlipidemia Father     Hypertension Father     Cancer Paternal Grandmother         stomach cancer    Cancer Other     Sleep Apnea Son        SOCIAL HISTORY:   Social History     Tobacco Use    Smoking status: Never    Smokeless tobacco: Never   Substance Use Topics    Alcohol use: Not Currently     Comment: occ       ALLERGIES:  Allergies   Allergen Reactions    Nkda [No Known Drug Allergy]        OUTPATIENT MEDICATIONS:    Current Outpatient Medications:     lisinopril (PRINIVIL) 30 MG tablet, Take 1 Tablet by mouth every day., Disp: 90 Tablet, Rfl: 4    NON SPECIFIED, Take 1 Cap by mouth every day. Cumin supplement, Disp: , Rfl:     multivitamin (THERAGRAN) Tab, Take 1 Tab by mouth every day., Disp: , Rfl:     ARMOUR THYROID 15 MG Tab, Take 15 mg by mouth every day. Take with 60 mg tab for a total dose of 75 mg, Disp: , Rfl:     ARMOUR THYROID 60 MG Tab, Take 60 mg by mouth every day. Take with 15 mg tab for a total dose of 75 mg, Disp: , Rfl:     OMEGA 3 PO, Take 1 Cap by mouth every day. Takes two, Disp: , Rfl:     PHYSICAL EXAM:  Vitals:    11/06/24 0809   BP: (!) 142/94   BP Location: Left arm   Patient Position: Sitting   BP Cuff Size: Large adult   Pulse: 76   Resp: 18   Temp: 36.4 °C (97.5 °F)   TempSrc: Temporal   SpO2: 96%   Weight: 110 kg (243 lb)   Height:  "1.778 m (5' 10\")       General: Pt resting in NAD, cooperative male.  Large muscle mass for age.   Skin:  Pink, warm and dry.  Neck: supple without adenopathy or thyromegaly; no carotid bruits appreciated.   HEENT: NC/AT. EOMI.  Lungs:  Symmetrical.  CTAB, good air movement.  Cardiovascular:  normal S1/S2, RRR without murmur or gallop.   Extremities:  Full range of motion.  CNS:  Muscle tone is normal. No gross focal neurologic deficits      ASSESSMENT/PLAN:   63 y.o. male     Problem List Items Addressed This Visit       Benign essential hypertension     Blood pressure not controlled.   Will increase to Lisinopril 30 mg daily.   Discussed lifestyle measures including diet, exercise.          Relevant Medications    lisinopril (PRINIVIL) 30 MG tablet    Other Relevant Orders    Nutrition (Dietary) Consult    CBC WITH DIFFERENTIAL    Comp Metabolic Panel    Dyslipidemia     Discussed risks and benefits of statins.  Repeat labs in next couple months.   Discussed healthy diets such as Mediterranean diet.          Relevant Orders    Nutrition (Dietary) Consult    Lipid Profile    Hypothyroidism     Continue current thyroid dosing.    Check TSH, Free T4, T3.          Relevant Orders    TSH+FREE T4    T3 FREE    Prediabetes     Discussed prediabetes, and his diabetes risk.    Consider Nutrition counseling.          Relevant Orders    Nutrition (Dietary) Consult    CBC WITH DIFFERENTIAL    Comp Metabolic Panel    HEMOGLOBIN A1C    Thyroid nodule     Thyroid ultrasound ordered for surveillance of his nodule. .           Relevant Orders    US-THYROID       Heriberto Tsang MD  [facility]       "

## 2024-11-06 NOTE — ASSESSMENT & PLAN NOTE
Discussed risks and benefits of statins.  Repeat labs in next couple months.   Discussed healthy diets such as Mediterranean diet.

## 2024-11-21 ENCOUNTER — OFFICE VISIT (OUTPATIENT)
Dept: SLEEP MEDICINE | Facility: MEDICAL CENTER | Age: 64
End: 2024-11-21
Attending: PHYSICIAN ASSISTANT
Payer: COMMERCIAL

## 2024-11-21 VITALS
HEART RATE: 67 BPM | OXYGEN SATURATION: 96 % | BODY MASS INDEX: 33.79 KG/M2 | HEIGHT: 70 IN | RESPIRATION RATE: 16 BRPM | DIASTOLIC BLOOD PRESSURE: 78 MMHG | SYSTOLIC BLOOD PRESSURE: 124 MMHG | WEIGHT: 236 LBS

## 2024-11-21 DIAGNOSIS — G47.33 OBSTRUCTIVE SLEEP APNEA: ICD-10-CM

## 2024-11-21 PROCEDURE — 99213 OFFICE O/P EST LOW 20 MIN: CPT | Performed by: PHYSICIAN ASSISTANT

## 2024-11-21 PROCEDURE — 3074F SYST BP LT 130 MM HG: CPT | Performed by: PHYSICIAN ASSISTANT

## 2024-11-21 PROCEDURE — 3078F DIAST BP <80 MM HG: CPT | Performed by: PHYSICIAN ASSISTANT

## 2024-11-21 ASSESSMENT — FIBROSIS 4 INDEX: FIB4 SCORE: 1.03

## 2024-11-21 ASSESSMENT — ENCOUNTER SYMPTOMS
SINUS PAIN: 0
DIZZINESS: 0
CHILLS: 0
COUGH: 1
ORTHOPNEA: 0
TREMORS: 0
HEADACHES: 0
SORE THROAT: 0
WEIGHT LOSS: 0
WHEEZING: 0
INSOMNIA: 0
ROS GI COMMENTS: NO DENTURES, NO MISSING TEETH, NO SWALLOWING ISSUES
SHORTNESS OF BREATH: 0
FEVER: 0
PALPITATIONS: 0
HEARTBURN: 0
SPUTUM PRODUCTION: 0

## 2024-11-21 NOTE — PATIENT INSTRUCTIONS
1-reviewed compliance which is excellent  2-will send order for mask and supplies to [facility]  3-As a reminder use distilled water only in humidifier chamber.    4-Today we reviewed equipment cleaning  once weekly minimum  mask, tubing and water chamber  use dedicated container  use mild soap and water  SoClean or other ozone  are not recommended  white vinegar and water solution is no longer recommended  hang tubing to dry  mask sanitizing wipes are an option for use   5-Equipment replacement schedule : Mask every 6 months, Head gear every 6 months, Tubing every 3 months, Ultra-fine filters 2 times per month, Humidifier chamber every 6 months

## 2024-11-21 NOTE — PROGRESS NOTES
"Chief Complaint   Patient presents with    Apnea     Last Office Visit 2/10/23 with BRENDA Alvarez.    PAP/O2/OAT: autoCPAP 8-10 cmH2O    Set up: 4/16/2020         HPI:  Brenton Joe is a 64 y.o. year old male here today for follow-up on obstructive sleep apnea.  Patient last seen in clinic 2/10/2023 by ALTHEA Oneil.  Previously evaluated by Dr. Caldwell on 9/1/2020.    Past Medical History: JONATHAN, acute hypoxic respiratory failure in 2021, hypothyroidism, hypertension, thyroid nodule, obesity.      Vitals:  /78 (BP Location: Left arm, Patient Position: Sitting, BP Cuff Size: Large adult)   Pulse 67   Resp 16   Ht 1.778 m (5' 10\")   Wt 107 kg (236 lb)   SpO2 96% BMI of 33.86 kg/m²    Recent Imaging: None    Echocardiogram obtained 2/9/2021 demonstrated normal left ventricular chamber size, wall thickness, systolic and diastolic function.  LVEF estimated 60%.  Normal right ventricular size and systolic function.  Estimated RVSP of 24 mmHg.    Currently using  Resmed auto CPAP @8-10 cm H20 pressure; compliance reviewed for 10/22/2024 through 11/28/2024, days used 30/30, average daily usage 8 hours 46 minutes, 100% of days greater than or equal to 4 hours, mask leak at 0 LPM at 95th percentile, AHI 0.2 per hour.  See media for full report.    Device obtained 4/16/2020  DME provider [facility]  Mask interface fullface mask    Polysomnogram split-night study from 2/20/2015 indicated overall AHI of 21.7 events per hour consistent with moderate obstructive sleep apnea.  Recommendation made for CPAP of 8 cm H2O pressure.      Sleep schedule goes to bed 10-11 p.m., wakens 6-7 a.m. , and gets up during the night x 1   Symptoms denies day time somnolence and denies morning headache, does report chronic cough unclear whether this is related to lisinopril.        Review of Systems   Constitutional:  Negative for chills, fever, malaise/fatigue and weight loss.   HENT:  Positive for hearing loss (left-doesn't " "wear hearing aid ordered). Negative for congestion, nosebleeds, sinus pain, sore throat and tinnitus.    Eyes:         Presc glasses   Respiratory:  Positive for cough (possibly lisinopril vs history of covid). Negative for sputum production, shortness of breath and wheezing.    Cardiovascular:  Negative for chest pain, palpitations, orthopnea and leg swelling.   Gastrointestinal:  Negative for heartburn.        No dentures, no missing teeth, no swallowing issues    Neurological:  Negative for dizziness, tremors and headaches.   Psychiatric/Behavioral:  The patient does not have insomnia.        Past Medical History:   Diagnosis Date    Anxiety disorder 2/10/2023    Chickenpox     COVID-19 12/2020    HTN (hypertension)     Hyperlipemia 3/31/2014    Hypothyroidism     Hypothyroidism 2014    Scarlet fever     Sleep apnea     Tear meniscus knee 2009    right repair    Thyroid nodule 2014    R \"incidental\" 2.1 mm    Tinnitus of both ears 3/28/2014       Past Surgical History:   Procedure Laterality Date    PB TOTAL KNEE ARTHROPLASTY Left 7/19/2021    Procedure: LEFT TOTAL KNEE ARTHROPLASTY;  Surgeon: Ramiro Calvo M.D.;  Location: [facility] [place];  Service: Orthopedics    COLONOSCOPY  3/30/12    normal- done by [facility]    KNEE ARTHROSCOPY  10/5/2009    Performed by CHEPE FRANZ at SURGERY [facility] [place]    MENISCECTOMY, KNEE, MEDIAL  10/5/2009    Performed by CHEPE FRANZ at [facility] ORS    FINGER EXPLORATION  7/2007    repair artery left index finger    ARTHROSCOPY, KNEE      INTUBATION      TONSILLECTOMY AND ADENOIDECTOMY         Family History   Problem Relation Age of Onset    Hyperlipidemia Mother     Hypertension Mother     Diabetes Mother         borderline DM    Hyperlipidemia Father     Hypertension Father     Cancer Paternal Grandmother         stomach cancer    Cancer Other     Sleep Apnea Son        Social History     Socioeconomic History    Marital status:      " Spouse name: Not on file    Number of children: 4    Years of education: Not on file    Highest education level: Professional school degree (e.g., MD, JUS, DVM, FERNY)   Occupational History    Occupation: district      Employer: OTHER   Tobacco Use    Smoking status: Never    Smokeless tobacco: Never   Vaping Use    Vaping status: Never Used   Substance and Sexual Activity    Alcohol use: Not Currently     Comment: occ    Drug use: No    Sexual activity: Yes     Partners: Male   Other Topics Concern    Not on file   Social History Narrative    ** Merged History Encounter **          Social Drivers of Health     Financial Resource Strain: Low Risk  (8/22/2023)    Overall Financial Resource Strain (CARDIA)     Difficulty of Paying Living Expenses: Not hard at all   Food Insecurity: No Food Insecurity (8/22/2023)    Hunger Vital Sign     Worried About Running Out of Food in the Last Year: Never true     Ran Out of Food in the Last Year: Never true   Transportation Needs: No Transportation Needs (8/22/2023)    PRAPARE - Transportation     Lack of Transportation (Medical): No     Lack of Transportation (Non-Medical): No   Physical Activity: Sufficiently Active (8/22/2023)    Exercise Vital Sign     Days of Exercise per Week: 5 days     Minutes of Exercise per Session: 60 min   Stress: Stress Concern Present (8/22/2023)    [employer] [place] [employer] - Occupational Stress Questionnaire     Feeling of Stress : To some extent   Social Connections: Moderately Integrated (8/22/2023)    Social Connection and Isolation Panel [NHANES]     Frequency of Communication with Friends and Family: More than three times a week     Frequency of Social Gatherings with Friends and Family: More than three times a week     Attends [employer] Services: Never     Active Member of Clubs or Organizations: Yes     Attends Club or Organization Meetings: More than 4 times per year     Marital Status:    Intimate Partner  Violence: Not on file   Housing Stability: Low Risk  (8/22/2023)    Housing Stability Vital Sign     Unable to Pay for Housing in the Last Year: No     Number of Places Lived in the Last Year: 1     Unstable Housing in the Last Year: No       Allergies as of 11/21/2024 - Reviewed 11/21/2024   Allergen Reaction Noted    Nkda [no known drug allergy]  07/28/2007          Current medications as of today   Current Outpatient Medications   Medication Sig Dispense Refill    lisinopril (PRINIVIL) 30 MG tablet Take 1 Tablet by mouth every day. 90 Tablet 4    NON SPECIFIED Take 1 Cap by mouth every day. Cumin supplement      multivitamin (THERAGRAN) Tab Take 1 Tab by mouth every day.      ARMOUR THYROID 15 MG Tab Take 15 mg by mouth every day. Take with 60 mg tab for a total dose of 75 mg      ARMOUR THYROID 60 MG Tab Take 60 mg by mouth every day. Take with 15 mg tab for a total dose of 75 mg      OMEGA 3 PO Take 1 Cap by mouth every day. Takes two       No current facility-administered medications for this visit.         Physical Exam:   Gen:           Alert and oriented, No apparent distress. Mood and affect appropriate, normal interaction with examiner.   Hearing:     Grossly intact.  Nose:          Normal, no lesions or deformities.  Dentition:    Good dentition.   Oropharynx:   Tongue normal, posterior pharynx without erythema or exudate.  Mallampati Classification: II  Neck:        Supple, trachea midline, no masses.  Respiratory Effort: No intercostal retractions or use of accessory muscles.   Gait and Station: Normal.  Digits and Nails: No clubbing, cyanosis, petechiae, or nodes.   Skin:        No rashes, lesions or ulcers noted.               Ext:           No cyanosis or edema.      Immunizations:  Flu: Annual recommended  Moderna SARS CoV2 Vaccine: 5/21/2022  Pfizer SARS-CoV-2 vaccine: 1/12/2021, 3/11/2021, 2/18/2021  COVID-19 mRNA vaccine: 12/3/2023    Assessment / Plan:  1. Obstructive sleep apnea  - DME Mask  and Supplies  - [facility] CPAP    Reviewed compliance which is excellent, demonstrating ongoing use and benefit.  Will send order for mask and supplies to [facility].  Patient was reminded to use distilled water only in humidifier chamber.  Reviewed equipment cleaning, reviewed equipment replacement schedule.  Patient to follow-up in 1 year or sooner if needed.  Patient does travel and is requesting order for travel CPAP, order placed and copy provided to patient.      Follow-up:   Return in about 1 year (around 11/21/2025) for Return with Yamini Mccain PA-C.    Please note that this dictation was created using voice recognition software. I have made every reasonable attempt to correct obvious errors, but it is possible there are errors of grammar and possibly content that I did not discover before finalizing the note.

## 2024-12-21 ENCOUNTER — HOSPITAL ENCOUNTER (OUTPATIENT)
Dept: RADIOLOGY | Facility: MEDICAL CENTER | Age: 64
End: 2024-12-21
Attending: FAMILY MEDICINE
Payer: COMMERCIAL

## 2024-12-21 DIAGNOSIS — E04.1 THYROID NODULE: ICD-10-CM

## 2024-12-21 PROCEDURE — 76536 US EXAM OF HEAD AND NECK: CPT

## 2025-01-05 ENCOUNTER — OFFICE VISIT (OUTPATIENT)
Dept: URGENT CARE | Facility: PHYSICIAN GROUP | Age: 65
End: 2025-01-05
Payer: COMMERCIAL

## 2025-01-05 VITALS
SYSTOLIC BLOOD PRESSURE: 150 MMHG | HEIGHT: 70 IN | TEMPERATURE: 98 F | DIASTOLIC BLOOD PRESSURE: 80 MMHG | WEIGHT: 247 LBS | HEART RATE: 67 BPM | BODY MASS INDEX: 35.36 KG/M2 | RESPIRATION RATE: 14 BRPM | OXYGEN SATURATION: 97 %

## 2025-01-05 DIAGNOSIS — K04.7 DENTAL INFECTION: ICD-10-CM

## 2025-01-05 DIAGNOSIS — R03.0 ELEVATED BLOOD PRESSURE READING: ICD-10-CM

## 2025-01-05 PROCEDURE — 3077F SYST BP >= 140 MM HG: CPT | Performed by: FAMILY MEDICINE

## 2025-01-05 PROCEDURE — 3079F DIAST BP 80-89 MM HG: CPT | Performed by: FAMILY MEDICINE

## 2025-01-05 PROCEDURE — 99214 OFFICE O/P EST MOD 30 MIN: CPT | Performed by: FAMILY MEDICINE

## 2025-01-05 ASSESSMENT — FIBROSIS 4 INDEX: FIB4 SCORE: 1.03

## 2025-01-05 NOTE — PROGRESS NOTES
"Subjective:      Chief Complaint   Patient presents with    Oral Swelling     X 10 days Lft upper tooth pain, swelling               Dental Pain   This is a new problem. The current episode started in the past 7 days. The problem occurs constantly (constant, throbbing) pain over left upper bicuspid. The problem has been worsening.     + tactile fever      Pertinent negatives include no chills, congestion, fatigue,   nausea, visual change or vomiting. Chewing aggravates the symptoms. Pt has tried tylenol for the symptoms - no improvement.         Social History     Tobacco Use    Smoking status: Never    Smokeless tobacco: Never   Vaping Use    Vaping status: Never Used   Substance Use Topics    Alcohol use: Not Currently     Comment: occ    Drug use: No         Current Outpatient Medications on File Prior to Visit   Medication Sig Dispense Refill    lisinopril (PRINIVIL) 30 MG tablet Take 1 Tablet by mouth every day. 90 Tablet 4    NON SPECIFIED Take 1 Cap by mouth every day. Cumin supplement      multivitamin (THERAGRAN) Tab Take 1 Tab by mouth every day.      ARMOUR THYROID 15 MG Tab Take 15 mg by mouth every day. Take with 60 mg tab for a total dose of 75 mg      ARMOUR THYROID 60 MG Tab Take 60 mg by mouth every day. Take with 15 mg tab for a total dose of 75 mg      OMEGA 3 PO Take 1 Cap by mouth every day. Takes two       No current facility-administered medications on file prior to visit.         Past Medical History:   Diagnosis Date    Anxiety disorder 2/10/2023    COVID-19 12/2020    Hyperlipemia 3/31/2014    Tinnitus of both ears 3/28/2014    Hypothyroidism 2014    Thyroid nodule 2014    R \"incidental\" 2.1 mm    Tear meniscus knee 2009    right repair    Chickenpox     HTN (hypertension)     Hypothyroidism     Scarlet fever     Sleep apnea              Objective:     BP (!) 150/80   Pulse 67   Temp 36.7 °C (98 °F)   Resp 14   Ht 1.778 m (5' 10\")   Wt 112 kg (247 lb)   SpO2 97%     Physical Exam "   Constitutional: pt is oriented to person, place, and time. Pt appears well-developed. No distress.   HENT:   Head: Normocephalic and atraumatic.   Mouth/Throat:     Gingival swelling and tenderness around  Upper  Lower  Left bicuspid tooth        Eyes: Conjunctivae are normal. Pupils are equal, round, and reactive to light. No scleral icterus.   Cardiovascular: Normal rate and regular rhythm.    Pulmonary/Chest: Effort normal and breath sounds normal. No respiratory distress. Pt has no wheezes.   Neurological: pt is alert and oriented to person, place, and time. No cranial nerve deficit.   Skin: Skin is warm. He is not diaphoretic. No erythema.   Psychiatric:  behavior is normal.   Nursing note and vitals reviewed.              Assessment/Plan:     1. Dental infection     - amoxicillin-clavulanate (AUGMENTIN) 875-125 MG Tab; Take 1 Tablet by mouth 2 times a day for 10 days.  Dispense: 20 Tablet; Refill: 0      Advised to see dentist asap           2. Elevated blood pressure reading   Likely secondary to pain  Advised f/u PCP    Differential diagnosis, natural history, supportive care, and indications for immediate follow-up discussed. All questions answered. Patient agrees with the plan of care.     Follow-up as needed if symptoms worsen or fail to improve to PCP, Urgent care or Emergency Room.     I have personally reviewed prior external notes and test results pertinent to today's visit.  I have independently reviewed and interpreted all diagnostics ordered during this urgent care acute visit.

## 2025-02-06 ENCOUNTER — HOSPITAL ENCOUNTER (OUTPATIENT)
Dept: LAB | Facility: MEDICAL CENTER | Age: 65
End: 2025-02-06
Attending: FAMILY MEDICINE
Payer: COMMERCIAL

## 2025-02-06 DIAGNOSIS — E03.9 HYPOTHYROIDISM, UNSPECIFIED TYPE: ICD-10-CM

## 2025-02-06 DIAGNOSIS — R73.03 PREDIABETES: ICD-10-CM

## 2025-02-06 DIAGNOSIS — I10 BENIGN ESSENTIAL HYPERTENSION: ICD-10-CM

## 2025-02-06 DIAGNOSIS — E78.5 DYSLIPIDEMIA: ICD-10-CM

## 2025-02-06 LAB
ALBUMIN SERPL BCP-MCNC: 3.9 G/DL (ref 3.2–4.9)
ALBUMIN/GLOB SERPL: 1.6 G/DL
ALP SERPL-CCNC: 68 U/L (ref 30–99)
ALT SERPL-CCNC: 47 U/L (ref 2–50)
ANION GAP SERPL CALC-SCNC: 8 MMOL/L (ref 7–16)
AST SERPL-CCNC: 47 U/L (ref 12–45)
BASOPHILS # BLD AUTO: 0.6 % (ref 0–1.8)
BASOPHILS # BLD: 0.04 K/UL (ref 0–0.12)
BILIRUB SERPL-MCNC: 0.8 MG/DL (ref 0.1–1.5)
BUN SERPL-MCNC: 14 MG/DL (ref 8–22)
CALCIUM ALBUM COR SERPL-MCNC: 8.8 MG/DL (ref 8.5–10.5)
CALCIUM SERPL-MCNC: 8.7 MG/DL (ref 8.5–10.5)
CHLORIDE SERPL-SCNC: 105 MMOL/L (ref 96–112)
CHOLEST SERPL-MCNC: 180 MG/DL (ref 100–199)
CO2 SERPL-SCNC: 27 MMOL/L (ref 20–33)
CREAT SERPL-MCNC: 1.1 MG/DL (ref 0.5–1.4)
EOSINOPHIL # BLD AUTO: 0.25 K/UL (ref 0–0.51)
EOSINOPHIL NFR BLD: 4 % (ref 0–6.9)
ERYTHROCYTE [DISTWIDTH] IN BLOOD BY AUTOMATED COUNT: 46.8 FL (ref 35.9–50)
EST. AVERAGE GLUCOSE BLD GHB EST-MCNC: 120 MG/DL
FASTING STATUS PATIENT QL REPORTED: NORMAL
GFR SERPLBLD CREATININE-BSD FMLA CKD-EPI: 75 ML/MIN/1.73 M 2
GLOBULIN SER CALC-MCNC: 2.4 G/DL (ref 1.9–3.5)
GLUCOSE SERPL-MCNC: 94 MG/DL (ref 65–99)
HBA1C MFR BLD: 5.8 % (ref 4–5.6)
HCT VFR BLD AUTO: 50.8 % (ref 42–52)
HDLC SERPL-MCNC: 42 MG/DL
HGB BLD-MCNC: 16.4 G/DL (ref 14–18)
IMM GRANULOCYTES # BLD AUTO: 0.02 K/UL (ref 0–0.11)
IMM GRANULOCYTES NFR BLD AUTO: 0.3 % (ref 0–0.9)
LDLC SERPL CALC-MCNC: 121 MG/DL
LYMPHOCYTES # BLD AUTO: 1.75 K/UL (ref 1–4.8)
LYMPHOCYTES NFR BLD: 28.2 % (ref 22–41)
MCH RBC QN AUTO: 29.4 PG (ref 27–33)
MCHC RBC AUTO-ENTMCNC: 32.3 G/DL (ref 32.3–36.5)
MCV RBC AUTO: 91.2 FL (ref 81.4–97.8)
MONOCYTES # BLD AUTO: 0.51 K/UL (ref 0–0.85)
MONOCYTES NFR BLD AUTO: 8.2 % (ref 0–13.4)
NEUTROPHILS # BLD AUTO: 3.63 K/UL (ref 1.82–7.42)
NEUTROPHILS NFR BLD: 58.7 % (ref 44–72)
NRBC # BLD AUTO: 0 K/UL
NRBC BLD-RTO: 0 /100 WBC (ref 0–0.2)
PLATELET # BLD AUTO: 257 K/UL (ref 164–446)
PMV BLD AUTO: 10.4 FL (ref 9–12.9)
POTASSIUM SERPL-SCNC: 4.2 MMOL/L (ref 3.6–5.5)
PROT SERPL-MCNC: 6.3 G/DL (ref 6–8.2)
RBC # BLD AUTO: 5.57 M/UL (ref 4.7–6.1)
SODIUM SERPL-SCNC: 140 MMOL/L (ref 135–145)
T3FREE SERPL-MCNC: 3.91 PG/ML (ref 2–4.4)
T4 FREE SERPL-MCNC: 0.98 NG/DL (ref 0.93–1.7)
TRIGL SERPL-MCNC: 84 MG/DL (ref 0–149)
TSH SERPL-ACNC: 1.99 UIU/ML (ref 0.35–5.5)
WBC # BLD AUTO: 6.2 K/UL (ref 4.8–10.8)

## 2025-02-06 PROCEDURE — 84439 ASSAY OF FREE THYROXINE: CPT

## 2025-02-06 PROCEDURE — 84443 ASSAY THYROID STIM HORMONE: CPT

## 2025-02-06 PROCEDURE — 80053 COMPREHEN METABOLIC PANEL: CPT

## 2025-02-06 PROCEDURE — 36415 COLL VENOUS BLD VENIPUNCTURE: CPT

## 2025-02-06 PROCEDURE — 83036 HEMOGLOBIN GLYCOSYLATED A1C: CPT

## 2025-02-06 PROCEDURE — 80061 LIPID PANEL: CPT

## 2025-02-06 PROCEDURE — 85025 COMPLETE CBC W/AUTO DIFF WBC: CPT

## 2025-02-06 PROCEDURE — 84481 FREE ASSAY (FT-3): CPT
